# Patient Record
Sex: FEMALE | Race: WHITE | NOT HISPANIC OR LATINO | URBAN - METROPOLITAN AREA
[De-identification: names, ages, dates, MRNs, and addresses within clinical notes are randomized per-mention and may not be internally consistent; named-entity substitution may affect disease eponyms.]

---

## 2018-04-30 ENCOUNTER — OUTPATIENT (OUTPATIENT)
Dept: OUTPATIENT SERVICES | Facility: HOSPITAL | Age: 73
LOS: 1 days | Discharge: HOME | End: 2018-04-30

## 2018-04-30 DIAGNOSIS — Z12.31 ENCOUNTER FOR SCREENING MAMMOGRAM FOR MALIGNANT NEOPLASM OF BREAST: ICD-10-CM

## 2020-05-14 ENCOUNTER — EMERGENCY (EMERGENCY)
Facility: HOSPITAL | Age: 75
LOS: 0 days | Discharge: HOME | End: 2020-05-14
Attending: EMERGENCY MEDICINE | Admitting: EMERGENCY MEDICINE
Payer: MEDICARE

## 2020-05-14 VITALS
OXYGEN SATURATION: 98 % | SYSTOLIC BLOOD PRESSURE: 212 MMHG | TEMPERATURE: 99 F | RESPIRATION RATE: 16 BRPM | DIASTOLIC BLOOD PRESSURE: 93 MMHG | HEART RATE: 100 BPM

## 2020-05-14 VITALS — DIASTOLIC BLOOD PRESSURE: 81 MMHG | SYSTOLIC BLOOD PRESSURE: 170 MMHG | HEART RATE: 86 BPM
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
## 2020-05-14 DIAGNOSIS — S09.90XA UNSPECIFIED INJURY OF HEAD, INITIAL ENCOUNTER: ICD-10-CM
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
## 2020-05-14 DIAGNOSIS — Z23 ENCOUNTER FOR IMMUNIZATION: ICD-10-CM

## 2020-05-14 DIAGNOSIS — S01.01XA LACERATION WITHOUT FOREIGN BODY OF SCALP, INITIAL ENCOUNTER: ICD-10-CM
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
## 2020-05-14 PROCEDURE — 70450 CT HEAD/BRAIN W/O DYE: CPT | Mod: 26

## 2020-05-14 PROCEDURE — 12001 RPR S/N/AX/GEN/TRNK 2.5CM/<: CPT

## 2020-05-14 PROCEDURE — 99284 EMERGENCY DEPT VISIT MOD MDM: CPT | Mod: 25

## 2020-05-14 RX ORDER — TETANUS TOXOID, REDUCED DIPHTHERIA TOXOID AND ACELLULAR PERTUSSIS VACCINE, ADSORBED 5; 2.5; 8; 8; 2.5 [IU]/.5ML; [IU]/.5ML; UG/.5ML; UG/.5ML; UG/.5ML
0.5 SUSPENSION INTRAMUSCULAR ONCE
Refills: 0 | Status: COMPLETED | OUTPATIENT
Start: 2020-05-14 | End: 2020-05-14

## 2020-05-14 RX ORDER — ACETAMINOPHEN 500 MG
650 TABLET ORAL ONCE
Refills: 0 | Status: COMPLETED | OUTPATIENT
Start: 2020-05-14 | End: 2020-05-14

## 2020-05-14 RX ADMIN — TETANUS TOXOID, REDUCED DIPHTHERIA TOXOID AND ACELLULAR PERTUSSIS VACCINE, ADSORBED 0.5 MILLILITER(S): 5; 2.5; 8; 8; 2.5 SUSPENSION INTRAMUSCULAR at 17:00

## 2020-05-14 RX ADMIN — Medication 650 MILLIGRAM(S): at 17:00

## 2020-05-14 NOTE — ED PROVIDER NOTE - CLINICAL SUMMARY MEDICAL DECISION MAKING FREE TEXT BOX
Laceration repaired without incident.  CT negative for intracranial pathology.  Head injury instructions discussed and strict return instructions discussed.

## 2020-05-14 NOTE — ED PROVIDER NOTE - PROGRESS NOTE DETAILS
FRANCOIS: Patient reports improvement in headache after tylenol, lac repaired, ct head negative for acute injury. Results discussed with patient, printed copies given. Patient agreeable and verbalizes understanding of plan of care, f/u, and return precautions.

## 2020-05-14 NOTE — ED PROVIDER NOTE - PHYSICAL EXAMINATION
VITALS:  I have reviewed the initial vital signs.  GENERAL: Well-developed, well-nourished, in no acute distress.  HEENT: 1.5 cm linear horizontal laceration to mid posterior scalp. no active bleeding/drainage. no subcutaneous structures visualized. No santana sign, raccoon eyes, or hemotympanum. Sclera clear. No hyphema. EOMI, PERRLA. No entrapment. No epistaxis or nasal septal hematoma. Dentition stable. No malocclusion. No [place] for[place] fracture. No facial bony ttp.  NECK: supple w FROM. No paraspinal muscle ttp. No midline cervical spinous tenderness, step offs, or deformity.  CARDIO: RRR, nl S1 and S2. No murmurs, rubs, or gallops. 2+ radial and pedal pulses bilaterally. No chest wall tenderness.  PULM: Normal effort. CTA b/l without wheezes, rales, or rhonchi.  MSK: FROM to extremities x4 w/o swelling/erythema/ecchymosis/deformity/ttp. No midline thoracic or lumbar spinous tenderness, step offs, or deformity. Normal steady gait.  GI: Abdomen soft and non-distended. Nontender throughout.  SKIN: Warm, dry.   NEURO: A&Ox3. Speech clear. CN II-XII intact. 5/5 strength to upper and lower extremities b/l. Sensation intact and equal throughout. Normal finger to nose. No pronator drift.

## 2020-05-14 NOTE — ED PROVIDER NOTE - NS ED ROS FT
EYES: (-) vision changes, (-) blurry vision, (-) double vision  ENT: (-) ear pain, (-) epistaxis, (-) tooth pain, (-) facial swelling  NECK: (-) neck pain, (-) neck stiffness  CARDIO: (-) chest pain, (-) palpitations  PULM: (-) cough, (-) shortness of breath  GI: (-) nausea, (-) vomiting, (-) abdominal pain  HEME: (-) easy bruising, (-) easy bleeding  MSK: (-) back pain, (-) gait difficulty, (-) joint pain, (-) deformity, (-) joint swelling  SKIN: see HPI, (-) ecchymosis  NEURO: see HPI, (-) LOC, (-) dizziness, (-) lightheadedness,  (-) weakness, (-) paresthesias, (-) numbness, (-) syncope, (-) speech changes, (-) facial droop, (-) confusion, (-) seizures    *all other systems negative except as documented above and in the HPI*

## 2020-05-14 NOTE — ED PROVIDER NOTE - ATTENDING CONTRIBUTION TO CARE
74 y/o female, non smoker with hx of HTN, DM, takes ASA 81mg QD presents to ED with head laceration s/p mechanical trip and fall at home with impact to occiput vs. wood table.  No LOC.  No numbness or tingling.  No HA/neck pain.  No weakness.  No blurry vison.  PE:  agree with above.  A/P  Head injury, scalp laceration.  Repair and imaging.

## 2020-05-14 NOTE — ED ADULT NURSE NOTE - NSIMPLEMENTINTERV_GEN_ALL_ED
Implemented All Universal Safety Interventions:  [place] to call system. Call bell, personal items and telephone within reach. Instruct patient to call for assistance. Room bathroom lighting operational. Non-slip footwear when patient is off stretcher. Physically safe environment: no spills, clutter or unnecessary equipment. Stretcher in lowest position, wheels locked, appropriate side rails in place.

## 2020-05-14 NOTE — ED PROVIDER NOTE - NSFOLLOWUPINSTRUCTIONS_ED_ALL_ED_FT
Laceration    A laceration is a cut that goes through all of the layers of the skin and into the tissue that is right under the skin. Some lacerations heal on their own. Others need to be closed with skin adhesive strips, skin glue, stitches (sutures), or staples. Proper laceration care minimizes the risk of infection and helps the laceration to heal better.  If non-absorbable stitches or staples have been placed, they must be taken out within the time frame instructed by your healthcare provider.    SEEK IMMEDIATE MEDICAL CARE IF YOU HAVE ANY OF THE FOLLOWING SYMPTOMS: swelling around the wound, worsening pain, drainage from the wound, red streaking going away from your wound, inability to move finger or toe near the laceration, or discoloration of skin near the laceration.      Closed Head Injury    A closed head injury is an injury to your head that may or may not involve a traumatic brain injury (TBI).  A CT scan of the head may not have been performed because they are usually normal after a concussion. Concussions are diagnosed and managed based on the history given and the symptoms experienced after the head injury. Most concussions do not cause serious problem and get better over several days.  Symptoms of TBI can be short or long lasting and include headache, dizziness, interference with memory or speech, fatigue, confusion, changes in sleep, mood changes, nausea, depression/anxiety, and dulling of senses. Make sure to obtain proper rest which includes getting plenty of sleep, avoiding excessive visual stimulation, and avoiding activities that may cause physical or mental stress. Avoid any situation where there is potential for another head injury, including sports.    SEEK IMMEDIATE MEDICAL CARE IF YOU HAVE ANY OF THE FOLLOWING SYMPTOMS: unusual drowsiness, vomiting, severe dizziness, seizures, lightheadedness, muscular weakness, different pupil sizes, visual changes, or clear or bloody discharge from your ears or nose.

## 2020-05-14 NOTE — ED PROVIDER NOTE - PATIENT PORTAL LINK FT
You can access the FollowMyHealth Patient Portal offered by [facility] by registering at the following website: [email or web address]. By joining [employer]’s FollowMyHealth portal, you will also be able to view your health information using other applications (apps) compatible with our system.

## 2020-05-14 NOTE — ED PROVIDER NOTE - OBJECTIVE STATEMENT
75 year old female w hx of HTN, DM, on 81 mg ASA daily, presents to the ED for evaluation of a head laceration sustained 1 hour prior to arrival. Pt states she was working in her home office, tripped on the corner of a floor mat causing her to fall backward and strike her posterior head against a wooden table. Denies LOC. Remembers everything before/during/after. Has been ambulatory since. Endorses mild, non-radiating, achy pain to site of laceration. No other injuries reported. Denies headaches, vision changes, neck pain/stiffness, chest pain, shortness of breath, back pain, abd pain, n/v, dizziness, lightheadedness, behavioral/mental status change, paresthesias, numbness, weakness.

## 2020-05-18 ENCOUNTER — TRANSCRIPTION ENCOUNTER (OUTPATIENT)
Age: 75
End: 2020-05-18

## 2021-03-05 ENCOUNTER — OUTPATIENT (OUTPATIENT)
Dept: OUTPATIENT SERVICES | Facility: HOSPITAL | Age: 76
LOS: 1 days | Discharge: HOME | End: 2021-03-05
Payer: MEDICARE

## 2021-03-05 DIAGNOSIS — Z12.31 ENCOUNTER FOR SCREENING MAMMOGRAM FOR MALIGNANT NEOPLASM OF BREAST: ICD-10-CM

## 2021-03-05 PROCEDURE — 77067 SCR MAMMO BI INCL CAD: CPT | Mod: 26

## 2021-03-05 PROCEDURE — 77063 BREAST TOMOSYNTHESIS BI: CPT | Mod: 26

## 2021-04-08 ENCOUNTER — OUTPATIENT (OUTPATIENT)
Dept: OUTPATIENT SERVICES | Facility: HOSPITAL | Age: 76
LOS: 1 days | Discharge: HOME | End: 2021-04-08
Payer: MEDICARE

## 2021-04-08 DIAGNOSIS — R92.8 OTHER ABNORMAL AND INCONCLUSIVE FINDINGS ON DIAGNOSTIC IMAGING OF BREAST: ICD-10-CM

## 2021-04-08 PROCEDURE — 77065 DX MAMMO INCL CAD UNI: CPT | Mod: 26,LT

## 2021-04-08 PROCEDURE — G0279: CPT | Mod: 26

## 2021-04-08 PROCEDURE — 76641 ULTRASOUND BREAST COMPLETE: CPT | Mod: 26,LT

## 2021-04-19 ENCOUNTER — RESULT REVIEW (OUTPATIENT)
Age: 76
End: 2021-04-19

## 2021-04-19 ENCOUNTER — OUTPATIENT (OUTPATIENT)
Dept: OUTPATIENT SERVICES | Facility: HOSPITAL | Age: 76
LOS: 1 days | Discharge: HOME | End: 2021-04-19
Payer: MEDICARE

## 2021-04-19 PROCEDURE — 88342 IMHCHEM/IMCYTCHM 1ST ANTB: CPT | Mod: 26

## 2021-04-19 PROCEDURE — 88305 TISSUE EXAM BY PATHOLOGIST: CPT | Mod: 26

## 2021-04-19 PROCEDURE — 77065 DX MAMMO INCL CAD UNI: CPT | Mod: 26,LT

## 2021-04-19 PROCEDURE — 19083 BX BREAST 1ST LESION US IMAG: CPT | Mod: LT

## 2021-04-19 PROCEDURE — 88341 IMHCHEM/IMCYTCHM EA ADD ANTB: CPT | Mod: 26

## 2021-04-20 LAB — SURGICAL PATHOLOGY STUDY: SIGNIFICANT CHANGE UP

## 2021-04-21 DIAGNOSIS — N63.20 UNSPECIFIED LUMP IN THE LEFT BREAST, UNSPECIFIED QUADRANT: ICD-10-CM

## 2021-04-21 DIAGNOSIS — R92.1 MAMMOGRAPHIC CALCIFICATION FOUND ON DIAGNOSTIC IMAGING OF BREAST: ICD-10-CM

## 2021-05-20 ENCOUNTER — APPOINTMENT (OUTPATIENT)
Dept: BREAST CENTER | Facility: CLINIC | Age: 76
End: 2021-05-20
Payer: MEDICARE

## 2021-05-20 VITALS
WEIGHT: 180 LBS | HEIGHT: 65 IN | BODY MASS INDEX: 29.99 KG/M2 | SYSTOLIC BLOOD PRESSURE: 140 MMHG | TEMPERATURE: 97.1 F | DIASTOLIC BLOOD PRESSURE: 90 MMHG

## 2021-05-20 DIAGNOSIS — Z86.39 PERSONAL HISTORY OF OTHER ENDOCRINE, NUTRITIONAL AND METABOLIC DISEASE: ICD-10-CM

## 2021-05-20 DIAGNOSIS — Z87.891 PERSONAL HISTORY OF NICOTINE DEPENDENCE: ICD-10-CM

## 2021-05-20 DIAGNOSIS — Z86.79 PERSONAL HISTORY OF OTHER DISEASES OF THE CIRCULATORY SYSTEM: ICD-10-CM

## 2021-05-20 DIAGNOSIS — Z78.9 OTHER SPECIFIED HEALTH STATUS: ICD-10-CM

## 2021-05-20 PROCEDURE — 99203 OFFICE O/P NEW LOW 30 MIN: CPT

## 2021-05-20 RX ORDER — SIMVASTATIN 80 MG/1
TABLET, FILM COATED ORAL
Refills: 0 | Status: ACTIVE | COMMUNITY

## 2021-05-20 RX ORDER — LOSARTAN POTASSIUM 100 MG/1
TABLET, FILM COATED ORAL
Refills: 0 | Status: ACTIVE | COMMUNITY

## 2021-05-20 RX ORDER — METFORMIN HYDROCHLORIDE 625 MG/1
TABLET ORAL
Refills: 0 | Status: ACTIVE | COMMUNITY

## 2021-05-20 RX ORDER — HYDROCHLOROTHIAZIDE 12.5 MG/1
TABLET ORAL
Refills: 0 | Status: ACTIVE | COMMUNITY

## 2021-05-20 NOTE — HISTORY OF PRESENT ILLNESS
[FreeTextEntry1] : PCP: Dr. Ashley Tim\par \par s/p US Guided Core Bx - 04/19/2021:\par Left, 5:00 N5, 1.6cm: (stoplight)\par - Complex sclerosing lesion (radial scar) containing microcalcifications.\par \par Pt denies any palpable lumps, breast pain, nipple discharge or inversion and / or skin changes.\par \par No previous breast intervention.\par \par (-) family hx - breast / ovarian cancer.\par \par Annika Model Risk Assessment:\par 5 yr - 2.5% \par Life - 5.5%\par

## 2021-05-20 NOTE — ASSESSMENT
[FreeTextEntry1] : LONNY is a aaron 76 year old patient who presented today for surgical consultation.\par She is status post ultrasound guided core biopsy on 04/19/2021.\par Pathology revealed:\par Left, 5:00 N5, 1.6cm: (stoplight)\par - Complex sclerosing lesion (radial scar) containing microcalcifications.\par \par On physical exam, there are no discrete masses in either breast or axilla.  There is no nipple discharge or inversion bilaterally.  There are no skin changes bilaterally.  \par \par Her pathology results were reviewed.  We discussed radial scars without atypia.  These lesions are considered fibroproliferative lesions without atypia.  Patients with these lesions were found to have a slightly increased relative risk compared to the reference population.  However, the lesions themselves do not have any malignant potential. There is about a 10-20% upgrade rate to a high-risk lesion (including atypical hyperplasias) and a <3% upgrade to cancer.  However, when atypia is present there is up to a 15-25% upgrade rate to cancer.  Both because of the upgrade rate to another high-risk lesion and because her diagnosis is not clear, we have discussed observation versus surgical excision of this lesion.  I have recommended that she pursue surgical excision at this time. Since this is not readily palpable, it will need to be localized preoperatively with a wire placement on the day of her surgery.  She agrees to lumpectomy with needle localization.  \par \par The benefits and risks of the lumpectomy procedure were explained to the patient, including but not limited to bleeding, infection, seroma and/or hematoma formation, pain, numbness of skin, scarring, and possible re-operation if the surgical excision demonstrates positive margins.  Informed consent was obtained today.  She is aware that she will meet with the pre-surgical department here at the hospital for pre-surgery testing.  She is also aware that she will likely need to meet with her primary care physician, and/or other medical specialists to obtain clearance for surgery, and to contact them appropriately.  \par \par I spent a total of 30 minutes of face to face time with this patient, greater than 50% of which was spent in counseling and/or coordination of care.\par All of her questions were appropriately answered.\par She knows to call with any concerns.

## 2021-05-20 NOTE — DATA REVIEWED
[FreeTextEntry1] : B/L Screening Mammo - 03/05/2021:\par MAMMOGRAM FINDINGS:\par Mammography was performed including the following views: bilateral craniocaudal with tomosynthesis, bilateral mediolateral oblique with tomosynthesis, and right anterior mediolateral oblique.  The examination includes digital synthetic 2D and digital tomosynthesis 3D images. Additional imaging analysis was performed using CAD (computer-aided detection) software.\par \par There are scattered areas of fibroglandular density.\par \par There is an apparent architectural distortion seen in the left breast.\par \par No suspicious mass, grouping of calcifications, or other abnormality is identified in the right breast.\par \par IMPRESSION:\par Apparent architectural distortion in the left breast requires additional evaluation.\par \par RECOMMENDATION:\par Patient will be recalled for additional mammographic views and, if indicated, breast ultrasound.\par \par ASSESSMENT:\par BI-RADS Category 0:  Incomplete: Needs Additional Imaging Evaluation\par \par \par Left Uni Dx Mammo & Sono - 04/08/2021:\par Breast composition:There are scattered areas of fibroglandular density.\par \par Mammographic findings: There is persistent architectural distortion in the inferior left breast, approximate 5-6 o'clock axis, posterior third. There is associated focal asymmetry. The area measures approximately 2 cm.\par \par Complete left breast ultrasound: Given the mammographic findings the left breast was examined in its entirety. The axilla is unremarkable. In the 5:00 axis, 5 cm from the nipple there is a 1.6 x 1.0 x 0.8 cm heterogeneous hypoechoic mass with shadowing. This corresponds in size, shape and location to the mammographic finding. Elsewhere in the left breast no solid or cystic lesion is seen.\par \par Impression:1.6 cm suspicious mass in the inferior left breast as above..\par \par Recommendation: Ultrasound guided biopsy.\par \par BI-RADS Category 4: Suspicious\par \par \par US Guided Core Bx - 04/19/2021:\par Left, 5:00 N5, 1.6cm: (stoplight)\par - Complex sclerosing lesion (radial scar) containing microcalcifications.\par

## 2021-06-07 ENCOUNTER — OUTPATIENT (OUTPATIENT)
Dept: OUTPATIENT SERVICES | Facility: HOSPITAL | Age: 76
LOS: 1 days | Discharge: HOME | End: 2021-06-07
Payer: MEDICARE

## 2021-06-07 ENCOUNTER — RESULT REVIEW (OUTPATIENT)
Age: 76
End: 2021-06-07

## 2021-06-07 VITALS
WEIGHT: 180.34 LBS | OXYGEN SATURATION: 99 % | TEMPERATURE: 98 F | HEIGHT: 65 IN | SYSTOLIC BLOOD PRESSURE: 143 MMHG | RESPIRATION RATE: 15 BRPM | DIASTOLIC BLOOD PRESSURE: 77 MMHG | HEART RATE: 78 BPM

## 2021-06-07 DIAGNOSIS — Z01.818 ENCOUNTER FOR OTHER PREPROCEDURAL EXAMINATION: ICD-10-CM

## 2021-06-07 DIAGNOSIS — N64.89 OTHER SPECIFIED DISORDERS OF BREAST: ICD-10-CM

## 2021-06-07 LAB
A1C WITH ESTIMATED AVERAGE GLUCOSE RESULT: 7.4 % — HIGH (ref 4–5.6)
ALBUMIN SERPL ELPH-MCNC: 4.3 G/DL — SIGNIFICANT CHANGE UP (ref 3.5–5.2)
ALP SERPL-CCNC: 64 U/L — SIGNIFICANT CHANGE UP (ref 30–115)
ALT FLD-CCNC: 9 U/L — SIGNIFICANT CHANGE UP (ref 0–41)
ANION GAP SERPL CALC-SCNC: 13 MMOL/L — SIGNIFICANT CHANGE UP (ref 7–14)
AST SERPL-CCNC: 13 U/L — SIGNIFICANT CHANGE UP (ref 0–41)
BASOPHILS # BLD AUTO: 0.04 K/UL — SIGNIFICANT CHANGE UP (ref 0–0.2)
BASOPHILS NFR BLD AUTO: 0.8 % — SIGNIFICANT CHANGE UP (ref 0–1)
BILIRUB SERPL-MCNC: 0.4 MG/DL — SIGNIFICANT CHANGE UP (ref 0.2–1.2)
BUN SERPL-MCNC: 16 MG/DL — SIGNIFICANT CHANGE UP (ref 10–20)
CALCIUM SERPL-MCNC: 9.4 MG/DL — SIGNIFICANT CHANGE UP (ref 8.5–10.1)
CHLORIDE SERPL-SCNC: 101 MMOL/L — SIGNIFICANT CHANGE UP (ref 98–110)
CO2 SERPL-SCNC: 25 MMOL/L — SIGNIFICANT CHANGE UP (ref 17–32)
CREAT SERPL-MCNC: 0.7 MG/DL — SIGNIFICANT CHANGE UP (ref 0.7–1.5)
EOSINOPHIL # BLD AUTO: 0.22 K/UL — SIGNIFICANT CHANGE UP (ref 0–0.7)
EOSINOPHIL NFR BLD AUTO: 4.6 % — SIGNIFICANT CHANGE UP (ref 0–8)
ESTIMATED AVERAGE GLUCOSE: 166 MG/DL — HIGH (ref 68–114)
GLUCOSE SERPL-MCNC: 249 MG/DL — HIGH (ref 70–99)
HCT VFR BLD CALC: 40.9 % — SIGNIFICANT CHANGE UP (ref 37–47)
HGB BLD-MCNC: 13.5 G/DL — SIGNIFICANT CHANGE UP (ref 12–16)
IMM GRANULOCYTES NFR BLD AUTO: 0.4 % — HIGH (ref 0.1–0.3)
LYMPHOCYTES # BLD AUTO: 0.94 K/UL — LOW (ref 1.2–3.4)
LYMPHOCYTES # BLD AUTO: 19.5 % — LOW (ref 20.5–51.1)
MCHC RBC-ENTMCNC: 28.5 PG — SIGNIFICANT CHANGE UP (ref 27–31)
MCHC RBC-ENTMCNC: 33 G/DL — SIGNIFICANT CHANGE UP (ref 32–37)
MCV RBC AUTO: 86.5 FL — SIGNIFICANT CHANGE UP (ref 81–99)
MONOCYTES # BLD AUTO: 0.29 K/UL — SIGNIFICANT CHANGE UP (ref 0.1–0.6)
MONOCYTES NFR BLD AUTO: 6 % — SIGNIFICANT CHANGE UP (ref 1.7–9.3)
NEUTROPHILS # BLD AUTO: 3.32 K/UL — SIGNIFICANT CHANGE UP (ref 1.4–6.5)
NEUTROPHILS NFR BLD AUTO: 68.7 % — SIGNIFICANT CHANGE UP (ref 42.2–75.2)
NRBC # BLD: 0 /100 WBCS — SIGNIFICANT CHANGE UP (ref 0–0)
PLATELET # BLD AUTO: 266 K/UL — SIGNIFICANT CHANGE UP (ref 130–400)
POTASSIUM SERPL-MCNC: 3.8 MMOL/L — SIGNIFICANT CHANGE UP (ref 3.5–5)
POTASSIUM SERPL-SCNC: 3.8 MMOL/L — SIGNIFICANT CHANGE UP (ref 3.5–5)
PROT SERPL-MCNC: 6.5 G/DL — SIGNIFICANT CHANGE UP (ref 6–8)
RBC # BLD: 4.73 M/UL — SIGNIFICANT CHANGE UP (ref 4.2–5.4)
RBC # FLD: 13.2 % — SIGNIFICANT CHANGE UP (ref 11.5–14.5)
SODIUM SERPL-SCNC: 139 MMOL/L — SIGNIFICANT CHANGE UP (ref 135–146)
WBC # BLD: 4.83 K/UL — SIGNIFICANT CHANGE UP (ref 4.8–10.8)
WBC # FLD AUTO: 4.83 K/UL — SIGNIFICANT CHANGE UP (ref 4.8–10.8)

## 2021-06-07 PROCEDURE — 71046 X-RAY EXAM CHEST 2 VIEWS: CPT | Mod: 26

## 2021-06-07 PROCEDURE — 93010 ELECTROCARDIOGRAM REPORT: CPT

## 2021-06-07 NOTE — H&P PST ADULT - HISTORY OF PRESENT ILLNESS
PT SCHEDULED FOR LEFT BREAST LUMPECTOMY WITH NEEDLE LOCALIZATION.  PT REPORTS- I HAVE A LUMP IN MY LEFT BREAST--I WENT FOR A ROUTINE MAMMO - WHEN THEY FOUND A LUMP IN MY LEFT BREAST.

## 2021-06-07 NOTE — H&P PST ADULT - REASON FOR ADMISSION
PT PRESENTS TO PAST WITH NO SOB, CP, PALPITATIONS, DYSURIA, UTI OR URI AT PRESENT.   PT ABLE TO WALK UP 2-3 FLIGHTS OF STEPS WITH NO SOB.  AS PER THE PT, THIS IS HIS/HER COMPLETE MEDICAL AND SURGICAL HX, INCLUDING MEDICATIONS PRESCRIBED AND OVER THE COUNTER  pt denies any covid s/s, or tested positive in the past  pt advised self quarantine till day of procedure  denies travel outside the [place] in the past 30 days  Anesthesia Alert  NO--Difficult Airway  NO--History of neck surgery or radiation  NO--Limited ROM of neck  NO--History of Malignant hyperthermia  NO--Personal or family history of Pseudocholinesterase deficiency  NO--Prior Anesthesia Complication  NO--Latex Allergy  NO--Loose teeth  NO--History of Rheumatoid Arthritis  NO--SULEMA  NO BLEEDING RISK  NO--OtheR

## 2021-06-18 ENCOUNTER — LABORATORY RESULT (OUTPATIENT)
Age: 76
End: 2021-06-18

## 2021-06-18 ENCOUNTER — OUTPATIENT (OUTPATIENT)
Dept: OUTPATIENT SERVICES | Facility: HOSPITAL | Age: 76
LOS: 1 days | Discharge: HOME | End: 2021-06-18

## 2021-06-18 DIAGNOSIS — Z11.59 ENCOUNTER FOR SCREENING FOR OTHER VIRAL DISEASES: ICD-10-CM

## 2021-06-18 PROBLEM — I10 ESSENTIAL (PRIMARY) HYPERTENSION: Chronic | Status: ACTIVE | Noted: 2021-06-07

## 2021-06-18 PROBLEM — E11.9 TYPE 2 DIABETES MELLITUS WITHOUT COMPLICATIONS: Chronic | Status: ACTIVE | Noted: 2021-06-07

## 2021-06-18 PROBLEM — E78.00 PURE HYPERCHOLESTEROLEMIA, UNSPECIFIED: Chronic | Status: ACTIVE | Noted: 2021-06-07

## 2021-06-21 ENCOUNTER — RESULT REVIEW (OUTPATIENT)
Age: 76
End: 2021-06-21

## 2021-06-21 ENCOUNTER — APPOINTMENT (OUTPATIENT)
Dept: BREAST CENTER | Facility: AMBULATORY SURGERY CENTER | Age: 76
End: 2021-06-21
Payer: MEDICARE

## 2021-06-21 ENCOUNTER — OUTPATIENT (OUTPATIENT)
Dept: OUTPATIENT SERVICES | Facility: HOSPITAL | Age: 76
LOS: 1 days | Discharge: HOME | End: 2021-06-21
Payer: MEDICARE

## 2021-06-21 VITALS
TEMPERATURE: 99 F | SYSTOLIC BLOOD PRESSURE: 129 MMHG | HEART RATE: 82 BPM | OXYGEN SATURATION: 97 % | RESPIRATION RATE: 18 BRPM | HEIGHT: 65 IN | WEIGHT: 180.34 LBS | DIASTOLIC BLOOD PRESSURE: 68 MMHG

## 2021-06-21 VITALS
RESPIRATION RATE: 20 BRPM | HEART RATE: 74 BPM | DIASTOLIC BLOOD PRESSURE: 73 MMHG | OXYGEN SATURATION: 98 % | SYSTOLIC BLOOD PRESSURE: 153 MMHG

## 2021-06-21 LAB — GLUCOSE BLDC GLUCOMTR-MCNC: 151 MG/DL — HIGH (ref 70–99)

## 2021-06-21 PROCEDURE — 19125 EXCISION BREAST LESION: CPT | Mod: LT

## 2021-06-21 PROCEDURE — 19285 PERQ DEV BREAST 1ST US IMAG: CPT | Mod: LT

## 2021-06-21 PROCEDURE — 88305 TISSUE EXAM BY PATHOLOGIST: CPT | Mod: 26

## 2021-06-21 PROCEDURE — 77065 DX MAMMO INCL CAD UNI: CPT | Mod: 26,LT

## 2021-06-21 PROCEDURE — 88341 IMHCHEM/IMCYTCHM EA ADD ANTB: CPT | Mod: 26

## 2021-06-21 PROCEDURE — 88342 IMHCHEM/IMCYTCHM 1ST ANTB: CPT | Mod: 26

## 2021-06-21 RX ORDER — SODIUM CHLORIDE 9 MG/ML
1000 INJECTION, SOLUTION INTRAVENOUS
Refills: 0 | Status: DISCONTINUED | OUTPATIENT
Start: 2021-06-21 | End: 2021-07-05

## 2021-06-21 RX ORDER — LOSARTAN/HYDROCHLOROTHIAZIDE 100MG-25MG
1 TABLET ORAL
Qty: 0 | Refills: 0 | DISCHARGE

## 2021-06-21 RX ORDER — ONDANSETRON 8 MG/1
4 TABLET, FILM COATED ORAL ONCE
Refills: 0 | Status: DISCONTINUED | OUTPATIENT
Start: 2021-06-21 | End: 2021-07-05

## 2021-06-21 RX ORDER — OXYCODONE AND ACETAMINOPHEN 5; 325 MG/1; MG/1
1 TABLET ORAL EVERY 4 HOURS
Refills: 0 | Status: DISCONTINUED | OUTPATIENT
Start: 2021-06-21 | End: 2021-06-21

## 2021-06-21 RX ADMIN — SODIUM CHLORIDE 100 MILLILITER(S): 9 INJECTION, SOLUTION INTRAVENOUS at 13:40

## 2021-06-21 NOTE — CHART NOTE - NSCHARTNOTEFT_GEN_A_CORE
PACU ANESTHESIA ADMISSION NOTE      Procedure: Lumpectomy of left breast with needle localization      Post op diagnosis:  Radial scar of left breast        ____  Intubated  TV:______       Rate: ______      FiO2: ______    _x___  Patent Airway    _x___  Full return of protective reflexes    _x___  Full recovery from anesthesia / back to baseline status    Vitals:  see anesthesia record  Mental Status:  _x___ Awake   _____ Alert   _____ Drowsy   _____ Sedated    Nausea/Vomiting:  _x___  NO       ______Yes,   See Post - Op Orders         Pain Scale (0-10):  __0___    Treatment: _x___ None    ____ See Post - Op/PCA Orders    Post - Operative Fluids:   __x__ Oral   ____ See Post - Op Orders    Plan: Discharge:   _x___Home       _____Floor     _____Critical Care    _____  Other:_________________    Comments:  No anesthesia issues or complications noted.  Discharge when criteria met.

## 2021-06-21 NOTE — ASU DISCHARGE PLAN (ADULT/PEDIATRIC) - CARE PROVIDER_API CALL
Juli Hsu)  Surgery  [address] [facility], [address] [place] [address]  Phone: [phone]  Fax: [phone]  Follow Up Time:

## 2021-06-21 NOTE — ASU DISCHARGE PLAN (ADULT/PEDIATRIC) - ASU DC SPECIAL INSTRUCTIONSFT
Resume regular diet  No heavy lifting more than 15 lbs for two weeks  Please remove plastic dressing in three days. Leave white tape in place. May shower tomorrow. Wear a supportive bra.   Take Tylenol for pain as needed.

## 2021-06-28 DIAGNOSIS — I10 ESSENTIAL (PRIMARY) HYPERTENSION: ICD-10-CM

## 2021-06-28 DIAGNOSIS — N63.23 UNSPECIFIED LUMP IN THE LEFT BREAST, LOWER OUTER QUADRANT: ICD-10-CM

## 2021-06-28 DIAGNOSIS — D24.2 BENIGN NEOPLASM OF LEFT BREAST: ICD-10-CM

## 2021-06-28 DIAGNOSIS — Z79.82 LONG TERM (CURRENT) USE OF ASPIRIN: ICD-10-CM

## 2021-06-28 DIAGNOSIS — E78.00 PURE HYPERCHOLESTEROLEMIA, UNSPECIFIED: ICD-10-CM

## 2021-06-28 DIAGNOSIS — L90.5 SCAR CONDITIONS AND FIBROSIS OF SKIN: ICD-10-CM

## 2021-06-28 DIAGNOSIS — Z79.84 LONG TERM (CURRENT) USE OF ORAL HYPOGLYCEMIC DRUGS: ICD-10-CM

## 2021-06-28 DIAGNOSIS — N60.32 FIBROSCLEROSIS OF LEFT BREAST: ICD-10-CM

## 2021-06-28 DIAGNOSIS — N60.22 FIBROADENOSIS OF LEFT BREAST: ICD-10-CM

## 2021-06-28 DIAGNOSIS — Z87.891 PERSONAL HISTORY OF NICOTINE DEPENDENCE: ICD-10-CM

## 2021-06-28 DIAGNOSIS — E11.9 TYPE 2 DIABETES MELLITUS WITHOUT COMPLICATIONS: ICD-10-CM

## 2021-06-28 LAB — SURGICAL PATHOLOGY STUDY: SIGNIFICANT CHANGE UP

## 2021-07-08 ENCOUNTER — APPOINTMENT (OUTPATIENT)
Dept: BREAST CENTER | Facility: CLINIC | Age: 76
End: 2021-07-08
Payer: MEDICARE

## 2021-07-08 VITALS
HEIGHT: 65 IN | BODY MASS INDEX: 29.99 KG/M2 | DIASTOLIC BLOOD PRESSURE: 90 MMHG | TEMPERATURE: 98 F | WEIGHT: 180 LBS | SYSTOLIC BLOOD PRESSURE: 152 MMHG

## 2021-07-08 PROCEDURE — 99024 POSTOP FOLLOW-UP VISIT: CPT

## 2021-07-08 NOTE — HISTORY OF PRESENT ILLNESS
[FreeTextEntry1] : PCP: Dr. Ashley Tim\par \par s/p US Guided Core Bx - 04/19/2021:\par Left, 5:00 N5, 1.6cm: (stoplight)\par - Complex sclerosing lesion (radial scar) containing microcalcifications.\par \par Pt denies any signs of infection; no erythema, no discharge, no swelling. Mild tenderness noted around incision area. \par \par No previous breast intervention.\par \par (-) family hx - breast / ovarian cancer.\par \par Annika Model Risk Assessment:\par 5 yr - 2.5% \par Life - 5.5%\par

## 2021-07-08 NOTE — REASON FOR VISIT
[Post Op: _________] : a [unfilled] post op visit [FreeTextEntry1] : She is s/p left breast lumpectomy with needle localization on 06/21/21

## 2021-07-08 NOTE — ASSESSMENT
[FreeTextEntry1] : Patient is status post  left breast lumpectomy with needle localization on 06/21/21\par She is feeling well\par She denies any fever/chills or erythema and / or drainage related to incision area. \par Her pain is well controlled, only complains of mild tenderness of the area.\par Her sutures were removed and pathology was discussed. \par \par She will need a right dx mammo in December 2021 and then f/up after testing.  I informed the patient that I am leaving and she will likely f/up with Dr. Hurst. \par \par \par I spent a total of 15 minutes of face to face time with this patient, greater than 50% of which was spent in counseling and/or coordination of care. All of her questions were appropriately answered. \par \par

## 2021-07-08 NOTE — DATA REVIEWED
[FreeTextEntry1] : Final Diagnosis\par Breast, left lower outer quadrant mass, needle localized\par lumpectomy:\par - Focal atypical lobular hyperplasia (ALH, see comment).\par - Complex sclerosing lesion (radial scar) located adjacent to a\par healing prior biopsy site and a separate intraductal papilloma\par containing focal florid duct hyperplasia.\par - Atrophic fatty breast tissue with proliferative type\par fibrocystic changes including florid duct hyperplasia, stromal\par fibrosis, sclerosing and apocrine adenosis, and\par microcalcifications.\par

## 2021-10-14 ENCOUNTER — OUTPATIENT (OUTPATIENT)
Dept: OUTPATIENT SERVICES | Facility: HOSPITAL | Age: 76
LOS: 1 days | Discharge: HOME | End: 2021-10-14
Payer: MEDICARE

## 2021-10-14 DIAGNOSIS — M25.569 PAIN IN UNSPECIFIED KNEE: ICD-10-CM

## 2021-10-14 PROCEDURE — 73562 X-RAY EXAM OF KNEE 3: CPT | Mod: 26,50

## 2021-12-09 ENCOUNTER — RESULT REVIEW (OUTPATIENT)
Age: 76
End: 2021-12-09

## 2021-12-09 ENCOUNTER — OUTPATIENT (OUTPATIENT)
Dept: OUTPATIENT SERVICES | Facility: HOSPITAL | Age: 76
LOS: 1 days | Discharge: HOME | End: 2021-12-09
Payer: MEDICARE

## 2021-12-09 DIAGNOSIS — R92.8 OTHER ABNORMAL AND INCONCLUSIVE FINDINGS ON DIAGNOSTIC IMAGING OF BREAST: ICD-10-CM

## 2021-12-09 PROCEDURE — 77065 DX MAMMO INCL CAD UNI: CPT | Mod: 26,LT

## 2021-12-09 PROCEDURE — G0279: CPT | Mod: 26

## 2022-01-14 ENCOUNTER — APPOINTMENT (OUTPATIENT)
Dept: BREAST CENTER | Facility: CLINIC | Age: 77
End: 2022-01-14
Payer: MEDICARE

## 2022-01-14 PROCEDURE — 99212 OFFICE O/P EST SF 10 MIN: CPT

## 2022-01-14 NOTE — DATA REVIEWED
[FreeTextEntry1] : Left Uni Dx Mammo - 12/09/2021:\par BREAST COMPOSITION: There are scattered areas of fibroglandular density.\par \par FINDINGS:\par \par Postsurgical architectural distortion is at the 6:00 axis of the left breast, posterior depth. Benign-appearing calcifications are present in the left breast.\par \par No suspicious masses, areas of nonsurgical architectural distortion, or calcifications are seen in the left breast.\par \par \par IMPRESSION:\par \par Post biopsy changes in the left breast.   No mammographic evidence of malignancy. Patient should return in 6 months for bilateral screening mammography.\par \par BI-RADS Category 2: Benign

## 2022-01-14 NOTE — ASSESSMENT
[FreeTextEntry1] : LONNY is a aaron 76 year old patient who presented today in follow up for a history of left breast radial scar, focal ALH and intraductal papilloma.  \par She has been doing well with no new breast related complaints. \par Imaging with a left breast unilateral diagnostic mammogram was done on 12/09/2021, which revealed there are scattered areas of fibroglandular density. Postsurgical architectural distortion is at the 6:00 axis of the left breast, posterior depth. Benign-appearing calcifications are present in the left breast.\par BI-RADS Category 2: Benign, as detailed above. \par Physical exam was unrevealing today.\par \par Imaging with a bilateral screening mammogram will be due in June 2022, and that will be scheduled today. \par She will return for follow-up and clinical breast exam in June 2022 as well.\par \par I spent a total of 15 minutes of face to face time with this patient, greater than 50% of which was spent in counseling and/or coordination of care.\par All of her questions were appropriately answered.\par She knows to call with any concerns.\par \par \par \par \par \par

## 2022-01-14 NOTE — HISTORY OF PRESENT ILLNESS
[FreeTextEntry1] : PCP: Dr. Ashley Tim\par \par s/p US Guided Core Bx - 04/19/2021:\par Left, 5:00 N5, 1.6cm: (stoplight)\par - Complex sclerosing lesion (radial scar) containing microcalcifications.\par \par (-) family hx - breast / ovarian cancer.\par \par Annika Model Risk Assessment: (recalculated w/ surgical path)\par 5 yr - 4.9% \par Life - 10.4%\par \par s/p Left Breast NLOC - 06/21/2021 = Focal atypical lobular hyperplasia. Complex sclerosing lesion (radial scar) located adjacent to a healing prior biopsy site and a separate intraductal papilloma containing focal florid duct hyperplasia.\par \par \par LONNY MELENDREZ is a 76 year old female patient who presents today in follow up for left breast radial scar, focal ALH and intraductal papilloma.\par Since her last visit, she has no new breast related complaints. \par Imaging with a left breast unilateral diagnostic mammogram was done on 12/09/2021, which revealed there are scattered areas of fibroglandular density. Postsurgical architectural distortion is at the 6:00 axis of the left breast, posterior depth. Benign-appearing calcifications are present in the left breast.\par BI-RADS Category 2: Benign .\par \par She presents today for evaluation and imaging review.\par \par

## 2022-01-14 NOTE — REASON FOR VISIT
[Follow-Up: _____] : a [unfilled] follow-up visit [FreeTextEntry1] : h/o left breast radial scar; imaging review.

## 2022-01-14 NOTE — PHYSICAL EXAM
[Normocephalic] : normocephalic [Atraumatic] : atraumatic [No Supraclavicular Adenopathy] : no supraclavicular adenopathy [No dominant masses] : no dominant masses in right breast  [No dominant masses] : no dominant masses left breast [No Nipple Discharge] : no left nipple discharge [de-identified] : B/L scattered fibroglandular densities.  [de-identified] : well healed surgical scar.

## 2022-06-17 ENCOUNTER — OUTPATIENT (OUTPATIENT)
Dept: OUTPATIENT SERVICES | Facility: HOSPITAL | Age: 77
LOS: 1 days | Discharge: HOME | End: 2022-06-17
Payer: MEDICARE

## 2022-06-17 ENCOUNTER — RESULT REVIEW (OUTPATIENT)
Age: 77
End: 2022-06-17

## 2022-06-17 DIAGNOSIS — Z12.31 ENCOUNTER FOR SCREENING MAMMOGRAM FOR MALIGNANT NEOPLASM OF BREAST: ICD-10-CM

## 2022-06-17 PROCEDURE — 77063 BREAST TOMOSYNTHESIS BI: CPT | Mod: 26

## 2022-06-17 PROCEDURE — 77067 SCR MAMMO BI INCL CAD: CPT | Mod: 26

## 2022-08-01 ENCOUNTER — APPOINTMENT (OUTPATIENT)
Dept: OPHTHALMOLOGY | Facility: CLINIC | Age: 77
End: 2022-08-01

## 2022-08-01 ENCOUNTER — OUTPATIENT (OUTPATIENT)
Dept: OUTPATIENT SERVICES | Facility: HOSPITAL | Age: 77
LOS: 1 days | Discharge: HOME | End: 2022-08-01

## 2022-08-01 PROCEDURE — 92136 OPHTHALMIC BIOMETRY: CPT | Mod: 26

## 2022-08-29 ENCOUNTER — TRANSCRIPTION ENCOUNTER (OUTPATIENT)
Age: 77
End: 2022-08-29

## 2022-08-29 ENCOUNTER — OUTPATIENT (OUTPATIENT)
Dept: OUTPATIENT SERVICES | Facility: HOSPITAL | Age: 77
LOS: 1 days | Discharge: HOME | End: 2022-08-29

## 2022-08-29 VITALS
TEMPERATURE: 97 F | RESPIRATION RATE: 18 BRPM | SYSTOLIC BLOOD PRESSURE: 136 MMHG | OXYGEN SATURATION: 100 % | DIASTOLIC BLOOD PRESSURE: 80 MMHG | HEART RATE: 95 BPM | HEIGHT: 65 IN | WEIGHT: 175.05 LBS

## 2022-08-29 VITALS — DIASTOLIC BLOOD PRESSURE: 72 MMHG | RESPIRATION RATE: 16 BRPM | SYSTOLIC BLOOD PRESSURE: 112 MMHG | HEART RATE: 87 BPM

## 2022-08-29 DIAGNOSIS — Z98.890 OTHER SPECIFIED POSTPROCEDURAL STATES: Chronic | ICD-10-CM

## 2022-08-29 LAB — GLUCOSE BLDC GLUCOMTR-MCNC: 207 MG/DL — HIGH (ref 70–99)

## 2022-08-29 RX ORDER — METFORMIN HYDROCHLORIDE 850 MG/1
1 TABLET ORAL
Qty: 0 | Refills: 0 | DISCHARGE

## 2022-08-29 RX ORDER — LOSARTAN POTASSIUM 100 MG/1
1 TABLET, FILM COATED ORAL
Qty: 0 | Refills: 0 | DISCHARGE

## 2022-08-29 RX ORDER — SIMVASTATIN 20 MG/1
1 TABLET, FILM COATED ORAL
Qty: 0 | Refills: 0 | DISCHARGE

## 2022-08-29 NOTE — ASU PREOP CHECKLIST - AS BP NONINV SITE
DATE OF SERVICE:  11/13/2017    PREOPERATIVE DIAGNOSES:  1.  Right hallux valgus deformity.  2.  Right interphalangeus.    POSTOPERATIVE DIAGNOSES:  1.  Right hallux valgus deformity.  2.  Right interphalangeus.    PROCEDURE PERFORMED:  1.  Right proximal hallux valgus correction with autologous bone grafting.  2.  Right Fantasma osteotomy.    SURGEON:  Moy Yost MD    FIRST ASSISTANT:  Eran Husain MD    SECOND ASSISTANT:  Amanda Scanlon.    ANESTHESIA:  General endotracheal with popliteal block per my request for   postoperative pain management.    ESTIMATED BLOOD LOSS:  None.    COMPLICATIONS:  None.    POSTOPERATIVE PLAN:  1.  Heel-touch weightbearing.  2.  Preoperative antibiotics.  3.  Follow up in one week.    INDICATIONS:  The patient is a pleasant 15-year-old female who has had   problems with her right foot.  The above diagnoses made and options were   discussed with her including operative and nonoperative.  She elected to   undergo operative intervention.  The above procedure was discussed and all   questions were answered.  Risks of surgery were explained, which include but   not limited to wound problems, infection, nerve injury, vascular injury, and   need for further surgery.  She understands she could have persistent risk for   pain, malunion, nonunion, joint stiffness, overcorrection, under correction,   and need for further surgery.  She understands and accepts these risks and   wished to proceed.  Site was marked by myself prior to receiving psychotropic   medicines.    PROCEDURE IN DETAIL:  The patient was given a popliteal block per my request   for postoperative pain management.  She was brought into the operating room   and underwent general endotracheal anesthesia without complications.  Right   lower extremity was prepped and draped in standard fashion in supine position   with all appropriate padding.  Positive site verification confirmed her right   lower extremity, as well  as procedure and confirmation that she received   preoperative antibiotics.  Esmarch was used to exsanguinate her foot and ankle   and leg tourniquet was inflated to ____ mmHg.    The patient was brought into the operating room after undergoing a popliteal   block per my request for postoperative pain management.  Her right lower   extremity was prepped and draped in standard fashion in supine position with   all appropriate padding.  Positive site verification confirmed her right lower   extremity, as well as the above procedure and confirmation that she received   preoperative antibiotics.  Esmarch was used to exsanguinate foot and ankle,   and leg tourniquet was inflated to 250 mmHg. Medial incision was made centered   over the medial eminence.  It extended back medial to the proximal TMT joint.    This is down to the capsule.  The capsule was opened up and was elevated off   of the medial eminence.  The medial eminence was excised with a 38 blade.    Proximally osteotomy was made in the proximal aspect of the metatarsal   medially going to, but not through the lateral cortex.  This allowed for   opening of the osteotomy.  This was done with a distractor.  Once it was   completed, the C-arm imaging confirmed neutralization of the intermetatarsal   angle.  An Arthrex proximal opening wedge plate was placed.  It was transfixed   with two screws proximal and distal.  A stab incision was made over the   lateral aspect of the calcaneus and the 4.5 drill sleeve was used to take   multiple cores of bone from the calcaneus.  It was then morselized and placed   into the osteotomy site.  Evaluation still demonstrated some significant   interphalangeus.  The incision was carried distal.  Blunt dissection was made   supraperiosteally over the proximal aspect of the proximal phalanx and a 0.038   sagittal saw blade was used to make a closing wedge osteotomy.  This was   transfixed with a [place] 3.0 cannulated screw from the  medial base of phalanx   crossing the osteotomy.  It had excellent compression.  The wound was then   irrigated with copious irrigation.  The medial capsule was repaired with   multiple 0 Vicryls.  Final C-arm imaging demonstrates satisfactory position   for internal fixation with neutralization of the intermetatarsal angle hallux   valgus angle and interphalangeus.  So at that time specific correction of the   DMA with the distal osteotomy was probably not indicated.  Wounds were   irrigated with copious irrigation and closed in layered fashion using 3-0   Vicryl and 3-0 nylon.  Sterile dressings were applied.  Tourniquet was   released.  All toes were pink.  She was transferred to recovery room in good   condition.    Utilization of Dr. Wyane was necessary for patient positioning, holding,   retracting, wound closure, dressing placement.  He was present throughout the   entire procedure.       ____________________________________     MD AMARA NAVARRO / CALLUM    DD:  11/13/2017 16:22:15  DT:  11/13/2017 16:52:36    D#:  [id number]  Job#:  [id number]   left upper arm

## 2022-08-29 NOTE — ASU PATIENT PROFILE, ADULT - FALL HARM RISK - HARM RISK INTERVENTIONS

## 2022-08-31 DIAGNOSIS — H26.9 UNSPECIFIED CATARACT: ICD-10-CM

## 2022-09-12 ENCOUNTER — APPOINTMENT (OUTPATIENT)
Dept: BREAST CENTER | Facility: CLINIC | Age: 77
End: 2022-09-12

## 2022-09-12 PROCEDURE — 99212 OFFICE O/P EST SF 10 MIN: CPT

## 2022-09-12 NOTE — ASSESSMENT
[FreeTextEntry1] : LONNY is a aaron 77 year old patient who presented today in follow up for a history of left breast radial scar, focal ALH and intraductal papilloma.  \par She has been doing well with no new breast related complaints. \par \par \par Her imaging is as follows;\par 06/17/2022 b/l mammo \par - scattered areas of fibroglandular density.\par -an area of benign architectural distortion corresponding to the site of surgery seen in the left breast.\par BIRADS2\par \par Physical exam was unrevealing today.\par \par PLAN:\par - bilateral screening mammogram will be due in June 2023, and that will be scheduled today. \par -She will return for follow-up and clinical breast exam in June 2023 as well.\par \par \par All of her questions were appropriately answered.\par She knows to call with any concerns.\par \par \par \par \par \par

## 2022-09-12 NOTE — DATA REVIEWED
[FreeTextEntry1] : ACC: [id number]     EXAM:  MG MAMMO SCREEN W SAVITA BI#\par \par PROCEDURE DATE:  06/17/2022\par \par \par \par INTERPRETATION:  HISTORY:\par Bilateral MG MAMMO SCREEN W SAVITA BI# was performed. Patient is 77 years old and is seen for screening. The patient has no personal history of cancer.  The patient has the following family history of breast cancer:  female cousin, breast cancer.\par \par RISK ASSESSMENT:\par NCI Lifetime Risk: 5.1\par Tyrer-Brittneyzick Lifetime Risk: 3.6\par \par CLINICAL BREAST EXAM:\par The patient reports their last clinical breast exam was performed within the past year.\par \par COMPARISON STUDIES:\par The present examination has been compared to prior imaging studies performed at [facility] on 04/30/2018, 03/05/2021, 04/08/2021, 04/19/2021, 06/21/2021 and 12/09/2021.\par \par MAMMOGRAM FINDINGS:\par Mammography was performed including the following views: bilateral craniocaudal with tomosynthesis, bilateral mediolateral oblique with tomosynthesis.  The examination includes digital synthetic 2D and digital tomosynthesis 3D images. Additional imaging analysis was performed using CAD (computer-aided detection) software.\par \par There are scattered areas of fibroglandular density.\par \par There is an area of benign architectural distortion corresponding to the site of surgery seen in the left breast.\par \par No suspicious mass, grouping of calcifications, or other abnormality is identified.\par \par IMPRESSION:\par There is no mammographic evidence of malignancy.\par \par RECOMMENDATION:\par Unless otherwise indicated by clinical findings, annual screening mammography recommended.\par \par ASSESSMENT:\par BI-RADS Category 2:  Benign\par \par

## 2022-09-12 NOTE — HISTORY OF PRESENT ILLNESS
[FreeTextEntry1] : PCP: Dr. Ashley Tim\par \par s/p US Guided Core Bx - 04/19/2021:\par Left, 5:00 N5, 1.6cm: (stoplight)\par - Complex sclerosing lesion (radial scar) containing microcalcifications.\par \par (-) family hx - breast / ovarian cancer.\par \par Annika Model Risk Assessment: (recalculated w/ surgical path)\par 5 yr - 4.9% \par Life - 10.4%\par \par s/p Left Breast NLOC - 06/21/2021 = Focal atypical lobular hyperplasia. Complex sclerosing lesion (radial scar) located adjacent to a healing prior biopsy site and a separate intraductal papilloma containing focal florid duct hyperplasia.\par \par \par LONNY MELENDREZ is a 76 year old female patient who presents today in follow up for left breast radial scar, focal ALH and intraductal papilloma.\par Since her last visit, she has no new breast related complaints. \par Imaging with a left breast unilateral diagnostic mammogram was done on 12/09/2021, which revealed there are scattered areas of fibroglandular density. Postsurgical architectural distortion is at the 6:00 axis of the left breast, posterior depth. Benign-appearing calcifications are present in the left breast.\par BI-RADS Category 2: Benign .\par \par INTERVAL HISTORY 9/8/22teodoro Ordonez is here for her six months follow up visit \par She has no breast related complaints at this time.  She denies any breast pain, has not palpated any new palpable masses in either breast and denies any nipple discharge or retraction.\par \par Her imaging is as follows;\par 06/17/2022 b/l mammo \par - scattered areas of fibroglandular density.\par -an area of benign architectural distortion corresponding to the site of surgery seen in the left breast.\par BIRADS2\par \par

## 2022-10-11 ENCOUNTER — OUTPATIENT (OUTPATIENT)
Dept: OUTPATIENT SERVICES | Facility: HOSPITAL | Age: 77
LOS: 1 days | Discharge: HOME | End: 2022-10-11

## 2022-10-11 DIAGNOSIS — M25.531 PAIN IN RIGHT WRIST: ICD-10-CM

## 2022-10-11 DIAGNOSIS — M79.641 PAIN IN RIGHT HAND: ICD-10-CM

## 2022-10-11 DIAGNOSIS — Z98.890 OTHER SPECIFIED POSTPROCEDURAL STATES: Chronic | ICD-10-CM

## 2022-10-11 PROCEDURE — 73130 X-RAY EXAM OF HAND: CPT | Mod: 26,RT

## 2022-10-11 PROCEDURE — 73090 X-RAY EXAM OF FOREARM: CPT | Mod: 26,RT

## 2022-10-11 PROCEDURE — 73110 X-RAY EXAM OF WRIST: CPT | Mod: 26,RT

## 2022-10-14 ENCOUNTER — EMERGENCY (EMERGENCY)
Facility: HOSPITAL | Age: 77
LOS: 0 days | Discharge: HOME | End: 2022-10-14
Attending: EMERGENCY MEDICINE | Admitting: EMERGENCY MEDICINE

## 2022-10-14 VITALS
TEMPERATURE: 97 F | HEIGHT: 65 IN | RESPIRATION RATE: 20 BRPM | HEART RATE: 94 BPM | SYSTOLIC BLOOD PRESSURE: 179 MMHG | OXYGEN SATURATION: 97 % | DIASTOLIC BLOOD PRESSURE: 75 MMHG

## 2022-10-14 DIAGNOSIS — M25.531 PAIN IN RIGHT WRIST: ICD-10-CM

## 2022-10-14 DIAGNOSIS — Z79.84 LONG TERM (CURRENT) USE OF ORAL HYPOGLYCEMIC DRUGS: ICD-10-CM

## 2022-10-14 DIAGNOSIS — E78.5 HYPERLIPIDEMIA, UNSPECIFIED: ICD-10-CM

## 2022-10-14 DIAGNOSIS — Z79.82 LONG TERM (CURRENT) USE OF ASPIRIN: ICD-10-CM

## 2022-10-14 DIAGNOSIS — I10 ESSENTIAL (PRIMARY) HYPERTENSION: ICD-10-CM
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
## 2022-10-14 DIAGNOSIS — E11.9 TYPE 2 DIABETES MELLITUS WITHOUT COMPLICATIONS: ICD-10-CM

## 2022-10-14 DIAGNOSIS — Z98.890 OTHER SPECIFIED POSTPROCEDURAL STATES: Chronic | ICD-10-CM

## 2022-10-14 DIAGNOSIS — S52.501A UNSPECIFIED FRACTURE OF THE LOWER END OF RIGHT RADIUS, INITIAL ENCOUNTER FOR CLOSED FRACTURE: ICD-10-CM

## 2022-10-14 PROCEDURE — 73090 X-RAY EXAM OF FOREARM: CPT | Mod: 26,RT

## 2022-10-14 PROCEDURE — 73110 X-RAY EXAM OF WRIST: CPT | Mod: 26,RT

## 2022-10-14 PROCEDURE — 73080 X-RAY EXAM OF ELBOW: CPT | Mod: 26,RT

## 2022-10-14 PROCEDURE — 25600 CLTX DST RDL FX/EPHYS SEP WO: CPT | Mod: 54

## 2022-10-14 PROCEDURE — 99284 EMERGENCY DEPT VISIT MOD MDM: CPT | Mod: GC,57

## 2022-10-14 NOTE — ED PROVIDER NOTE - CARE PROVIDER_API CALL
Raza Hernandez)  Orthopaedic Surgery  [address] [place] [address]  Phone: [phone]  Fax: [phone]  Follow Up Time: 7-10 Days

## 2022-10-14 NOTE — ED PROVIDER NOTE - PHYSICAL EXAMINATION
CONSTITUTIONAL: well-developed, well-nourished, in no acute distress  SKIN: warm, dry  HEAD: Normocephalic atraumatic  EYES: no conjunctival erythema  ENT: no nasal discharge, airway clear  NECK: full ROM, non-tender  RESP: normal respiratory effort  EXT: moving all extremities spontaneously  NEURO: alert and oriented, grossly unremarkable right hand  4+/5 strength full sensation +2 radial pulse, distal radius head tenderness with some overlying bruises   PSYCH: cooperative, appropriate

## 2022-10-14 NOTE — ED PROVIDER NOTE - ATTENDING CONTRIBUTION TO CARE
77-year-old female to ED status post FOOSH injury 1 week ago.  She did hit her head no LOC was evaluated PMD office.  X-ray of patient noted a fracture sent to ED for evaluation.  Exam as noted

## 2022-10-14 NOTE — ED PROVIDER NOTE - NS ED ROS FT
Constitutional: No fever   Eyes:  No visual changes  Ears:  No hearing changes  Neck: No neck pain  Cardiac:  No chest pain  Respiratory:  No SOB   GI:  No abdominal pain, nausea, or vomiting  :  No dysuria  MS:  No back pain +wrist pain  Neuro:  No headache or weakness.  No LOC  Skin:  No skin rash

## 2022-10-14 NOTE — ED PROVIDER NOTE - BIRTH SEX
normal appearance , without tenderness upon palpation , no deformities , trachea midline, no masses , no JVD.
Female

## 2022-10-14 NOTE — ED PROVIDER NOTE - PATIENT PORTAL LINK FT
You can access the FollowMyHealth Patient Portal offered by [facility] by registering at the following website: [email or web address]. By joining [employer]’s FollowMyHealth portal, you will also be able to view your health information using other applications (apps) compatible with our system.

## 2022-10-14 NOTE — ED PROVIDER NOTE - OBJECTIVE STATEMENT
77F w/ pmhx DM HLD HTN who was sent in from PMD right distal radius fracture. she had a FOOSH 1 week ago on her right wrist. Hit her head but no LOC N V HA AC use. she had minimal pain at wrist so she did not seek care initially. swelling increased so she saw her PMD who order XR and found the fracture. denies decreased sensation right hand and but endorses decreased strength due to pain when engaging hand .

## 2022-10-14 NOTE — ED ADULT TRIAGE NOTE - CHIEF COMPLAINT QUOTE
s/p trip and fall x 1 week ago, +right wrist pain, patient had an outpatient xray showing right wrist fracture

## 2022-10-21 ENCOUNTER — RESULT CHARGE (OUTPATIENT)
Age: 77
End: 2022-10-21

## 2022-10-21 ENCOUNTER — APPOINTMENT (OUTPATIENT)
Dept: ORTHOPEDIC SURGERY | Facility: CLINIC | Age: 77
End: 2022-10-21

## 2022-10-21 VITALS — HEIGHT: 65 IN | BODY MASS INDEX: 28.32 KG/M2 | WEIGHT: 170 LBS

## 2022-10-21 DIAGNOSIS — Z00.00 ENCOUNTER FOR GENERAL ADULT MEDICAL EXAMINATION W/OUT ABNORMAL FINDINGS: ICD-10-CM

## 2022-10-21 PROCEDURE — 73110 X-RAY EXAM OF WRIST: CPT | Mod: 26,RT

## 2022-10-21 PROCEDURE — 29075 APPL CST ELBW FNGR SHORT ARM: CPT | Mod: RT

## 2022-10-21 PROCEDURE — 99203 OFFICE O/P NEW LOW 30 MIN: CPT | Mod: 25

## 2022-10-21 NOTE — DATA REVIEWED
[FreeTextEntry1] :   X-rays taken the office today of her right wrist show an intra-articular impacted distal radius fracture.

## 2022-10-21 NOTE — DISCUSSION/SUMMARY
[de-identified] :   Today I discontinued the sugar-tong splint placed patient in a well fitted, molded, fiberglass short-arm cast.\par She tolerated this well.\par She will continue moving her fingers and elbow.  She was instructed not to get the cast wet.\par I will see her back in 2-3 weeks for cast off, repeat x-ray and evaluation.\par All questions were answered today.

## 2022-10-21 NOTE — PHYSICAL EXAM
[de-identified] : Physical exam of her right wrist:  Mild swelling.  Negative ecchymosis.  No obvious deformity.  Mild pain over the distal radius.  Nontender over the distal ulna.  Negative anatomical snuffbox tenderness.  She has good range of motion of the wrist with mild pain.  Her sensory and motor are intact.

## 2022-10-21 NOTE — HISTORY OF PRESENT ILLNESS
[de-identified] :   Patient is a 77-year-old female here for evaluation of her right wrist.  She states that on 10/07/2022, she fell on her outstretched arm.  She went to [facility] where she had x-rays done was diagnosed with a wrist fracture.

## 2022-11-02 ENCOUNTER — APPOINTMENT (OUTPATIENT)
Dept: ORTHOPEDIC SURGERY | Facility: CLINIC | Age: 77
End: 2022-11-02

## 2022-11-02 VITALS — WEIGHT: 170 LBS | HEIGHT: 65 IN | BODY MASS INDEX: 28.32 KG/M2

## 2022-11-02 PROCEDURE — 73110 X-RAY EXAM OF WRIST: CPT | Mod: RT

## 2022-11-02 NOTE — PHYSICAL EXAM
[de-identified] : Physical exam of her right wrist:  Minimal swelling.  Negative ecchymosis.  No obvious deformity.  Minimal tenderness over the fracture site.  She has good range of motion of the wrist and hand.  Her sensory and motor are intact

## 2022-11-02 NOTE — DATA REVIEWED
[FreeTextEntry1] :  X-rays repeated in the office today for right wrist show a healing impacted intra-articular distal radius fracture.

## 2022-11-02 NOTE — HISTORY OF PRESENT ILLNESS
[de-identified] : Patient is a 77-year-old female here for repeat evaluation of her right wrist.  She is about 3 weeks status post intra-articular displaced and impacted distal radius fracture.  She is doing well.  Today I removed her short-arm cast.

## 2022-11-23 ENCOUNTER — APPOINTMENT (OUTPATIENT)
Dept: ORTHOPEDIC SURGERY | Facility: CLINIC | Age: 77
End: 2022-11-23

## 2022-11-23 VITALS — HEIGHT: 65 IN | WEIGHT: 170 LBS | BODY MASS INDEX: 28.32 KG/M2

## 2022-11-23 DIAGNOSIS — S52.571A OTHER INTRAARTICULAR FRACTURE OF LOWER END OF RIGHT RADIUS, INITIAL ENCOUNTER FOR CLOSED FRACTURE: ICD-10-CM

## 2022-11-23 PROCEDURE — 73110 X-RAY EXAM OF WRIST: CPT | Mod: RT

## 2022-11-23 PROCEDURE — 99213 OFFICE O/P EST LOW 20 MIN: CPT

## 2022-11-23 NOTE — DATA REVIEWED
[FreeTextEntry1] :   X-rays taken the office today for right wrist show a healing intra-articular distal radius fracture.

## 2022-11-23 NOTE — DISCUSSION/SUMMARY
[de-identified] :   Patient is 6 weeks out from the injury.\par The patient understands that fractures take about 6 weeks to heal.  Random residual pain can occur for 6 months to a year.\par She may return to normal activities as tolerated.\par Follow up on an as needed basis. \par All questions were answered in the office today.

## 2022-11-23 NOTE — PHYSICAL EXAM
[de-identified] :   Physical exam of her right wrist:  Negative swelling or ecchymosis.  Nontender over the fracture site.  She has mild pain with range of motion of the wrist.  Mild decreased flexion and extension of the wrist.  She can make a full fist.  Her sensory and motor are intact.

## 2022-11-23 NOTE — HISTORY OF PRESENT ILLNESS
[de-identified] : Patient is a 77-year-old male female here for repeat evaluation of her right wrist.  She is status post a right distal radius fracture.  She is doing well.  She is accompanied by her .

## 2023-06-23 ENCOUNTER — RESULT REVIEW (OUTPATIENT)
Age: 78
End: 2023-06-23

## 2023-06-23 ENCOUNTER — OUTPATIENT (OUTPATIENT)
Dept: OUTPATIENT SERVICES | Facility: HOSPITAL | Age: 78
LOS: 1 days | End: 2023-06-23
Payer: MEDICARE

## 2023-06-23 DIAGNOSIS — Z98.890 OTHER SPECIFIED POSTPROCEDURAL STATES: Chronic | ICD-10-CM

## 2023-06-23 DIAGNOSIS — Z00.8 ENCOUNTER FOR OTHER GENERAL EXAMINATION: ICD-10-CM

## 2023-06-23 DIAGNOSIS — Z12.31 ENCOUNTER FOR SCREENING MAMMOGRAM FOR MALIGNANT NEOPLASM OF BREAST: ICD-10-CM

## 2023-06-23 PROCEDURE — 77063 BREAST TOMOSYNTHESIS BI: CPT

## 2023-06-23 PROCEDURE — 77067 SCR MAMMO BI INCL CAD: CPT | Mod: 26

## 2023-06-23 PROCEDURE — 77067 SCR MAMMO BI INCL CAD: CPT

## 2023-06-23 PROCEDURE — 77063 BREAST TOMOSYNTHESIS BI: CPT | Mod: 26

## 2023-06-24 DIAGNOSIS — Z12.31 ENCOUNTER FOR SCREENING MAMMOGRAM FOR MALIGNANT NEOPLASM OF BREAST: ICD-10-CM

## 2023-06-26 ENCOUNTER — APPOINTMENT (OUTPATIENT)
Dept: BREAST CENTER | Facility: CLINIC | Age: 78
End: 2023-06-26
Payer: MEDICARE

## 2023-06-26 DIAGNOSIS — N64.89 OTHER SPECIFIED DISORDERS OF BREAST: ICD-10-CM

## 2023-06-26 DIAGNOSIS — N60.99 UNSPECIFIED BENIGN MAMMARY DYSPLASIA OF UNSPECIFIED BREAST: ICD-10-CM

## 2023-06-26 DIAGNOSIS — D24.9 BENIGN NEOPLASM OF UNSPECIFIED BREAST: ICD-10-CM

## 2023-06-26 PROCEDURE — 99212 OFFICE O/P EST SF 10 MIN: CPT

## 2023-06-26 NOTE — DATA REVIEWED
[FreeTextEntry1] : ACC: [id number]     EXAM:  MG MAMMO SCREEN W SAVITA BI#   ORDERED BY: ANGY RUSSO\par \par PROCEDURE DATE:  06/23/2023\par \par \par \par INTERPRETATION:  HISTORY:\par Bilateral MG MAMMO SCREEN W SAVITA BI# was performed. Patient is 78 years old and is seen for screening. The patient has no personal history of cancer.  The patient has the following family history of breast cancer:  female cousin, breast cancer.\par \par RISK ASSESSMENT:\par NCI Lifetime Risk: 4.8\par Tyrer-Cuzick Lifetime Risk: 3.4\par \par CLINICAL BREAST EXAM:\par The patient reports their last clinical breast exam was performed within the past year.\par \par COMPARISON STUDIES:\par The present examination has been compared to prior imaging studies performed at [facility] on 06/21/2021, 12/09/2021 and 06/17/2022.\par \par MAMMOGRAM FINDINGS:\par Mammography was performed including the following views: bilateral craniocaudal with tomosynthesis, bilateral mediolateral oblique with tomosynthesis.  The examination includes digital synthetic 2D and digital tomosynthesis 3D images. Additional imaging analysis was performed using CAD (computer-aided detection) software.\par \par There are scattered areas of fibroglandular density.\par \par There is an area of benign architectural distortion corresponding to the site of surgery seen in the left breast.\par \par No suspicious mass, grouping of calcifications, or other abnormality is identified.\par \par IMPRESSION:\par There is no mammographic evidence of malignancy.\par \par RECOMMENDATION:\par Unless otherwise indicated by clinical findings, annual screening mammography recommended.\par \par ASSESSMENT:\par BI-RADS Category 2:  Benign\par

## 2023-06-26 NOTE — PHYSICAL EXAM
[Normocephalic] : normocephalic [Atraumatic] : atraumatic [EOMI] : extra ocular movement intact [Examined in the supine and seated position] : examined in the supine and seated position [Symmetrical] : symmetrical [No dominant masses] : no dominant masses in right breast  [No dominant masses] : no dominant masses left breast [No Nipple Retraction] : no left nipple retraction [No Nipple Discharge] : no left nipple discharge [No Axillary Lymphadenopathy] : no left axillary lymphadenopathy [No Edema] : no edema [No Rashes] : no rashes [No Ulceration] : no ulceration [de-identified] : On physical exam, there are no discrete masses in either breast or axilla. There is no nipple discharge or inversion bilaterally. There are no skin changes bilaterally.\par \par

## 2023-06-26 NOTE — HISTORY OF PRESENT ILLNESS
[FreeTextEntry1] : PCP: Dr. Ashley Tim\par \par s/p US Guided Core Bx - 04/19/2021:\par Left, 5:00 N5, 1.6cm: (stoplight)\par - Complex sclerosing lesion (radial scar) containing microcalcifications.\par \par (-) family hx - breast / ovarian cancer.\par \par Annika Model Risk Assessment: (recalculated w/ surgical path)\par 5 yr - 4.9% \par Life - 10.4%\par \par s/p Left Breast NLOC - 06/21/2021 = Focal atypical lobular hyperplasia. Complex sclerosing lesion (radial scar) located adjacent to a healing prior biopsy site and a separate intraductal papilloma containing focal florid duct hyperplasia.\par \par \par LONNY MELENDREZ is a 76 year old female patient who presents today in follow up for left breast radial scar, focal ALH and intraductal papilloma.\par Since her last visit, she has no new breast related complaints. \par Imaging with a left breast unilateral diagnostic mammogram was done on 12/09/2021, which revealed there are scattered areas of fibroglandular density. Postsurgical architectural distortion is at the 6:00 axis of the left breast, posterior depth. Benign-appearing calcifications are present in the left breast.\par BI-RADS Category 2: Benign .\par \par INTERVAL HISTORY 9/8/22\par Lonny is here for her six months follow up visit \par She has no breast related complaints at this time.  She denies any breast pain, has not palpated any new palpable masses in either breast and denies any nipple discharge or retraction.\par \par Her imaging is as follows;\par 06/17/2022 b/l mammo \par - scattered areas of fibroglandular density.\par -an area of benign architectural distortion corresponding to the site of surgery seen in the left breast.\par BIRADS2\par \par INTERVAL HISTORY 6/26/23\par Lonny is here for her one year follow up visit \par She has no breast related complaints at this time.  She denies any breast pain, has not palpated any new palpable masses in either breast and denies any nipple discharge or retraction.\par \par Her imaging is as follows;\par 06/23/2023 b/l mammo\par -scattered areas of fibroglandular density\par - an area of benign architectural distortion corresponding to the site of surgery seen in the left breast\par BIRADS2\par

## 2023-06-26 NOTE — ASSESSMENT
[FreeTextEntry1] : LONNY is a aaron 78 year old patient who presented today in follow up for a history of left breast radial scar, focal ALH and intraductal papilloma.  \par She has been doing well with no new breast related complaints. \par \par \par Her imaging is as follows;\par 06/23/2023 b/l mammo\par -scattered areas of fibroglandular density\par - an area of benign architectural distortion corresponding to the site of surgery seen in the left breast\par BIRADS2\par \par Physical exam was unrevealing today.\par \par PLAN:\par - bilateral screening mammogram will be due in June 24, 2024, and that will be scheduled today. \par -She will return for follow-up and clinical breast exam in June 2024 as well.\par \par \par All of her questions were appropriately answered.\par She knows to call with any concerns.\par \par \par \par \par \par

## 2023-10-20 ENCOUNTER — INPATIENT (INPATIENT)
Facility: HOSPITAL | Age: 78
LOS: 1 days | Discharge: HOME | DRG: 195 | End: 2023-10-22
Attending: INTERNAL MEDICINE | Admitting: INTERNAL MEDICINE
Payer: MEDICARE

## 2023-10-20 VITALS
SYSTOLIC BLOOD PRESSURE: 133 MMHG | HEART RATE: 115 BPM | OXYGEN SATURATION: 99 % | DIASTOLIC BLOOD PRESSURE: 77 MMHG | TEMPERATURE: 102 F | RESPIRATION RATE: 18 BRPM | HEIGHT: 65 IN | WEIGHT: 173.94 LBS

## 2023-10-20 DIAGNOSIS — Z98.890 OTHER SPECIFIED POSTPROCEDURAL STATES: Chronic | ICD-10-CM

## 2023-10-20 DIAGNOSIS — J18.9 PNEUMONIA, UNSPECIFIED ORGANISM: ICD-10-CM

## 2023-10-20 LAB
ALBUMIN SERPL ELPH-MCNC: 4.3 G/DL — SIGNIFICANT CHANGE UP (ref 3.5–5.2)
ALBUMIN SERPL ELPH-MCNC: 4.3 G/DL — SIGNIFICANT CHANGE UP (ref 3.5–5.2)
ALP SERPL-CCNC: 68 U/L — SIGNIFICANT CHANGE UP (ref 30–115)
ALP SERPL-CCNC: 68 U/L — SIGNIFICANT CHANGE UP (ref 30–115)
ALT FLD-CCNC: 12 U/L — SIGNIFICANT CHANGE UP (ref 0–41)
ALT FLD-CCNC: 12 U/L — SIGNIFICANT CHANGE UP (ref 0–41)
ANION GAP SERPL CALC-SCNC: 12 MMOL/L — SIGNIFICANT CHANGE UP (ref 7–14)
ANION GAP SERPL CALC-SCNC: 12 MMOL/L — SIGNIFICANT CHANGE UP (ref 7–14)
APTT BLD: 27.4 SEC — SIGNIFICANT CHANGE UP (ref 27–39.2)
APTT BLD: 27.4 SEC — SIGNIFICANT CHANGE UP (ref 27–39.2)
AST SERPL-CCNC: 17 U/L — SIGNIFICANT CHANGE UP (ref 0–41)
AST SERPL-CCNC: 17 U/L — SIGNIFICANT CHANGE UP (ref 0–41)
BASE EXCESS BLDV CALC-SCNC: 3.2 MMOL/L — HIGH (ref -2–3)
BASE EXCESS BLDV CALC-SCNC: 3.2 MMOL/L — HIGH (ref -2–3)
BASOPHILS # BLD AUTO: 0 K/UL — SIGNIFICANT CHANGE UP (ref 0–0.2)
BASOPHILS # BLD AUTO: 0 K/UL — SIGNIFICANT CHANGE UP (ref 0–0.2)
BASOPHILS NFR BLD AUTO: 0 % — SIGNIFICANT CHANGE UP (ref 0–1)
BASOPHILS NFR BLD AUTO: 0 % — SIGNIFICANT CHANGE UP (ref 0–1)
BILIRUB SERPL-MCNC: 0.8 MG/DL — SIGNIFICANT CHANGE UP (ref 0.2–1.2)
BILIRUB SERPL-MCNC: 0.8 MG/DL — SIGNIFICANT CHANGE UP (ref 0.2–1.2)
BUN SERPL-MCNC: 12 MG/DL — SIGNIFICANT CHANGE UP (ref 10–20)
BUN SERPL-MCNC: 12 MG/DL — SIGNIFICANT CHANGE UP (ref 10–20)
CA-I SERPL-SCNC: 1.12 MMOL/L — LOW (ref 1.15–1.33)
CA-I SERPL-SCNC: 1.12 MMOL/L — LOW (ref 1.15–1.33)
CALCIUM SERPL-MCNC: 8.9 MG/DL — SIGNIFICANT CHANGE UP (ref 8.4–10.5)
CALCIUM SERPL-MCNC: 8.9 MG/DL — SIGNIFICANT CHANGE UP (ref 8.4–10.5)
CHLORIDE SERPL-SCNC: 100 MMOL/L — SIGNIFICANT CHANGE UP (ref 98–110)
CHLORIDE SERPL-SCNC: 100 MMOL/L — SIGNIFICANT CHANGE UP (ref 98–110)
CO2 SERPL-SCNC: 23 MMOL/L — SIGNIFICANT CHANGE UP (ref 17–32)
CO2 SERPL-SCNC: 23 MMOL/L — SIGNIFICANT CHANGE UP (ref 17–32)
CREAT SERPL-MCNC: 0.7 MG/DL — SIGNIFICANT CHANGE UP (ref 0.7–1.5)
CREAT SERPL-MCNC: 0.7 MG/DL — SIGNIFICANT CHANGE UP (ref 0.7–1.5)
EGFR: 89 ML/MIN/1.73M2 — SIGNIFICANT CHANGE UP
EGFR: 89 ML/MIN/1.73M2 — SIGNIFICANT CHANGE UP
EOSINOPHIL # BLD AUTO: 0 K/UL — SIGNIFICANT CHANGE UP (ref 0–0.7)
EOSINOPHIL # BLD AUTO: 0 K/UL — SIGNIFICANT CHANGE UP (ref 0–0.7)
EOSINOPHIL NFR BLD AUTO: 0 % — SIGNIFICANT CHANGE UP (ref 0–8)
EOSINOPHIL NFR BLD AUTO: 0 % — SIGNIFICANT CHANGE UP (ref 0–8)
FLUAV AG NPH QL: SIGNIFICANT CHANGE UP
FLUAV AG NPH QL: SIGNIFICANT CHANGE UP
FLUBV AG NPH QL: SIGNIFICANT CHANGE UP
FLUBV AG NPH QL: SIGNIFICANT CHANGE UP
GAS PNL BLDV: 132 MMOL/L — LOW (ref 136–145)
GAS PNL BLDV: 132 MMOL/L — LOW (ref 136–145)
GAS PNL BLDV: SIGNIFICANT CHANGE UP
GAS PNL BLDV: SIGNIFICANT CHANGE UP
GLUCOSE BLDC GLUCOMTR-MCNC: 134 MG/DL — HIGH (ref 70–99)
GLUCOSE BLDC GLUCOMTR-MCNC: 134 MG/DL — HIGH (ref 70–99)
GLUCOSE SERPL-MCNC: 206 MG/DL — HIGH (ref 70–99)
GLUCOSE SERPL-MCNC: 206 MG/DL — HIGH (ref 70–99)
HCO3 BLDV-SCNC: 27 MMOL/L — SIGNIFICANT CHANGE UP (ref 22–29)
HCO3 BLDV-SCNC: 27 MMOL/L — SIGNIFICANT CHANGE UP (ref 22–29)
HCT VFR BLD CALC: 36.3 % — LOW (ref 37–47)
HCT VFR BLD CALC: 36.3 % — LOW (ref 37–47)
HCT VFR BLDA CALC: 32 % — LOW (ref 39–51)
HCT VFR BLDA CALC: 32 % — LOW (ref 39–51)
HGB BLD CALC-MCNC: 10.7 G/DL — LOW (ref 12.6–17.4)
HGB BLD CALC-MCNC: 10.7 G/DL — LOW (ref 12.6–17.4)
HGB BLD-MCNC: 12.3 G/DL — SIGNIFICANT CHANGE UP (ref 12–16)
HGB BLD-MCNC: 12.3 G/DL — SIGNIFICANT CHANGE UP (ref 12–16)
INR BLD: 1.05 RATIO — SIGNIFICANT CHANGE UP (ref 0.65–1.3)
INR BLD: 1.05 RATIO — SIGNIFICANT CHANGE UP (ref 0.65–1.3)
LACTATE BLDV-MCNC: 0.8 MMOL/L — SIGNIFICANT CHANGE UP (ref 0.5–2)
LACTATE BLDV-MCNC: 0.8 MMOL/L — SIGNIFICANT CHANGE UP (ref 0.5–2)
LACTATE SERPL-SCNC: 1.7 MMOL/L — SIGNIFICANT CHANGE UP (ref 0.7–2)
LACTATE SERPL-SCNC: 1.7 MMOL/L — SIGNIFICANT CHANGE UP (ref 0.7–2)
LYMPHOCYTES # BLD AUTO: 0.18 K/UL — LOW (ref 1.2–3.4)
LYMPHOCYTES # BLD AUTO: 0.18 K/UL — LOW (ref 1.2–3.4)
LYMPHOCYTES # BLD AUTO: 2.6 % — LOW (ref 20.5–51.1)
LYMPHOCYTES # BLD AUTO: 2.6 % — LOW (ref 20.5–51.1)
MANUAL SMEAR VERIFICATION: SIGNIFICANT CHANGE UP
MANUAL SMEAR VERIFICATION: SIGNIFICANT CHANGE UP
MCHC RBC-ENTMCNC: 29.2 PG — SIGNIFICANT CHANGE UP (ref 27–31)
MCHC RBC-ENTMCNC: 29.2 PG — SIGNIFICANT CHANGE UP (ref 27–31)
MCHC RBC-ENTMCNC: 33.9 G/DL — SIGNIFICANT CHANGE UP (ref 32–37)
MCHC RBC-ENTMCNC: 33.9 G/DL — SIGNIFICANT CHANGE UP (ref 32–37)
MCV RBC AUTO: 86.2 FL — SIGNIFICANT CHANGE UP (ref 81–99)
MCV RBC AUTO: 86.2 FL — SIGNIFICANT CHANGE UP (ref 81–99)
MONOCYTES # BLD AUTO: 0.36 K/UL — SIGNIFICANT CHANGE UP (ref 0.1–0.6)
MONOCYTES # BLD AUTO: 0.36 K/UL — SIGNIFICANT CHANGE UP (ref 0.1–0.6)
MONOCYTES NFR BLD AUTO: 5.2 % — SIGNIFICANT CHANGE UP (ref 1.7–9.3)
MONOCYTES NFR BLD AUTO: 5.2 % — SIGNIFICANT CHANGE UP (ref 1.7–9.3)
NEUTROPHILS # BLD AUTO: 6.37 K/UL — SIGNIFICANT CHANGE UP (ref 1.4–6.5)
NEUTROPHILS # BLD AUTO: 6.37 K/UL — SIGNIFICANT CHANGE UP (ref 1.4–6.5)
NEUTROPHILS NFR BLD AUTO: 91.3 % — HIGH (ref 42.2–75.2)
NEUTROPHILS NFR BLD AUTO: 91.3 % — HIGH (ref 42.2–75.2)
NEUTS BAND # BLD: 0.9 % — SIGNIFICANT CHANGE UP (ref 0–6)
NEUTS BAND # BLD: 0.9 % — SIGNIFICANT CHANGE UP (ref 0–6)
OVALOCYTES BLD QL SMEAR: SLIGHT — SIGNIFICANT CHANGE UP
OVALOCYTES BLD QL SMEAR: SLIGHT — SIGNIFICANT CHANGE UP
PCO2 BLDV: 37 MMHG — LOW (ref 39–42)
PCO2 BLDV: 37 MMHG — LOW (ref 39–42)
PH BLDV: 7.47 — HIGH (ref 7.32–7.43)
PH BLDV: 7.47 — HIGH (ref 7.32–7.43)
PLAT MORPH BLD: NORMAL — SIGNIFICANT CHANGE UP
PLAT MORPH BLD: NORMAL — SIGNIFICANT CHANGE UP
PLATELET # BLD AUTO: 164 K/UL — SIGNIFICANT CHANGE UP (ref 130–400)
PLATELET # BLD AUTO: 164 K/UL — SIGNIFICANT CHANGE UP (ref 130–400)
PMV BLD: 9.3 FL — SIGNIFICANT CHANGE UP (ref 7.4–10.4)
PMV BLD: 9.3 FL — SIGNIFICANT CHANGE UP (ref 7.4–10.4)
PO2 BLDV: 52 MMHG — SIGNIFICANT CHANGE UP
PO2 BLDV: 52 MMHG — SIGNIFICANT CHANGE UP
POIKILOCYTOSIS BLD QL AUTO: SLIGHT — SIGNIFICANT CHANGE UP
POIKILOCYTOSIS BLD QL AUTO: SLIGHT — SIGNIFICANT CHANGE UP
POLYCHROMASIA BLD QL SMEAR: SLIGHT — SIGNIFICANT CHANGE UP
POLYCHROMASIA BLD QL SMEAR: SLIGHT — SIGNIFICANT CHANGE UP
POTASSIUM BLDV-SCNC: 2.7 MMOL/L — CRITICAL LOW (ref 3.5–5.1)
POTASSIUM BLDV-SCNC: 2.7 MMOL/L — CRITICAL LOW (ref 3.5–5.1)
POTASSIUM SERPL-MCNC: 3.2 MMOL/L — LOW (ref 3.5–5)
POTASSIUM SERPL-MCNC: 3.2 MMOL/L — LOW (ref 3.5–5)
POTASSIUM SERPL-SCNC: 3.2 MMOL/L — LOW (ref 3.5–5)
POTASSIUM SERPL-SCNC: 3.2 MMOL/L — LOW (ref 3.5–5)
PROT SERPL-MCNC: 6.3 G/DL — SIGNIFICANT CHANGE UP (ref 6–8)
PROT SERPL-MCNC: 6.3 G/DL — SIGNIFICANT CHANGE UP (ref 6–8)
PROTHROM AB SERPL-ACNC: 12 SEC — SIGNIFICANT CHANGE UP (ref 9.95–12.87)
PROTHROM AB SERPL-ACNC: 12 SEC — SIGNIFICANT CHANGE UP (ref 9.95–12.87)
RBC # BLD: 4.21 M/UL — SIGNIFICANT CHANGE UP (ref 4.2–5.4)
RBC # BLD: 4.21 M/UL — SIGNIFICANT CHANGE UP (ref 4.2–5.4)
RBC # FLD: 13.3 % — SIGNIFICANT CHANGE UP (ref 11.5–14.5)
RBC # FLD: 13.3 % — SIGNIFICANT CHANGE UP (ref 11.5–14.5)
RBC BLD AUTO: ABNORMAL
RBC BLD AUTO: ABNORMAL
RSV RNA NPH QL NAA+NON-PROBE: SIGNIFICANT CHANGE UP
RSV RNA NPH QL NAA+NON-PROBE: SIGNIFICANT CHANGE UP
SAO2 % BLDV: 86.4 % — SIGNIFICANT CHANGE UP
SAO2 % BLDV: 86.4 % — SIGNIFICANT CHANGE UP
SARS-COV-2 RNA SPEC QL NAA+PROBE: SIGNIFICANT CHANGE UP
SARS-COV-2 RNA SPEC QL NAA+PROBE: SIGNIFICANT CHANGE UP
SODIUM SERPL-SCNC: 135 MMOL/L — SIGNIFICANT CHANGE UP (ref 135–146)
SODIUM SERPL-SCNC: 135 MMOL/L — SIGNIFICANT CHANGE UP (ref 135–146)
WBC # BLD: 6.91 K/UL — SIGNIFICANT CHANGE UP (ref 4.8–10.8)
WBC # BLD: 6.91 K/UL — SIGNIFICANT CHANGE UP (ref 4.8–10.8)
WBC # FLD AUTO: 6.91 K/UL — SIGNIFICANT CHANGE UP (ref 4.8–10.8)
WBC # FLD AUTO: 6.91 K/UL — SIGNIFICANT CHANGE UP (ref 4.8–10.8)

## 2023-10-20 PROCEDURE — 99285 EMERGENCY DEPT VISIT HI MDM: CPT | Mod: FS

## 2023-10-20 PROCEDURE — 93010 ELECTROCARDIOGRAM REPORT: CPT

## 2023-10-20 PROCEDURE — 83036 HEMOGLOBIN GLYCOSYLATED A1C: CPT

## 2023-10-20 PROCEDURE — 86803 HEPATITIS C AB TEST: CPT

## 2023-10-20 PROCEDURE — 71045 X-RAY EXAM CHEST 1 VIEW: CPT | Mod: 26

## 2023-10-20 PROCEDURE — 83735 ASSAY OF MAGNESIUM: CPT

## 2023-10-20 PROCEDURE — 99222 1ST HOSP IP/OBS MODERATE 55: CPT

## 2023-10-20 PROCEDURE — 80053 COMPREHEN METABOLIC PANEL: CPT

## 2023-10-20 PROCEDURE — 82962 GLUCOSE BLOOD TEST: CPT

## 2023-10-20 PROCEDURE — 85025 COMPLETE CBC W/AUTO DIFF WBC: CPT

## 2023-10-20 PROCEDURE — 36415 COLL VENOUS BLD VENIPUNCTURE: CPT

## 2023-10-20 RX ORDER — DEXTROSE 50 % IN WATER 50 %
15 SYRINGE (ML) INTRAVENOUS ONCE
Refills: 0 | Status: DISCONTINUED | OUTPATIENT
Start: 2023-10-20 | End: 2023-10-22

## 2023-10-20 RX ORDER — SODIUM CHLORIDE 9 MG/ML
1000 INJECTION, SOLUTION INTRAVENOUS
Refills: 0 | Status: DISCONTINUED | OUTPATIENT
Start: 2023-10-20 | End: 2023-10-22

## 2023-10-20 RX ORDER — POTASSIUM CHLORIDE 20 MEQ
20 PACKET (EA) ORAL ONCE
Refills: 0 | Status: COMPLETED | OUTPATIENT
Start: 2023-10-20 | End: 2023-10-20

## 2023-10-20 RX ORDER — GLUCAGON INJECTION, SOLUTION 0.5 MG/.1ML
1 INJECTION, SOLUTION SUBCUTANEOUS ONCE
Refills: 0 | Status: DISCONTINUED | OUTPATIENT
Start: 2023-10-20 | End: 2023-10-22

## 2023-10-20 RX ORDER — INSULIN LISPRO 100/ML
VIAL (ML) SUBCUTANEOUS
Refills: 0 | Status: DISCONTINUED | OUTPATIENT
Start: 2023-10-20 | End: 2023-10-22

## 2023-10-20 RX ORDER — ENOXAPARIN SODIUM 100 MG/ML
40 INJECTION SUBCUTANEOUS EVERY 24 HOURS
Refills: 0 | Status: DISCONTINUED | OUTPATIENT
Start: 2023-10-20 | End: 2023-10-22

## 2023-10-20 RX ORDER — INFLUENZA VIRUS VACCINE 15; 15; 15; 15 UG/.5ML; UG/.5ML; UG/.5ML; UG/.5ML
0.7 SUSPENSION INTRAMUSCULAR ONCE
Refills: 0 | Status: DISCONTINUED | OUTPATIENT
Start: 2023-10-20 | End: 2023-10-22

## 2023-10-20 RX ORDER — DEXTROSE 50 % IN WATER 50 %
25 SYRINGE (ML) INTRAVENOUS ONCE
Refills: 0 | Status: DISCONTINUED | OUTPATIENT
Start: 2023-10-20 | End: 2023-10-22

## 2023-10-20 RX ORDER — ACETAMINOPHEN 500 MG
975 TABLET ORAL ONCE
Refills: 0 | Status: COMPLETED | OUTPATIENT
Start: 2023-10-20 | End: 2023-10-20

## 2023-10-20 RX ORDER — SIMVASTATIN 20 MG/1
20 TABLET, FILM COATED ORAL AT BEDTIME
Refills: 0 | Status: DISCONTINUED | OUTPATIENT
Start: 2023-10-20 | End: 2023-10-22

## 2023-10-20 RX ORDER — AMLODIPINE BESYLATE 2.5 MG/1
5 TABLET ORAL DAILY
Refills: 0 | Status: DISCONTINUED | OUTPATIENT
Start: 2023-10-20 | End: 2023-10-22

## 2023-10-20 RX ORDER — DEXTROSE 50 % IN WATER 50 %
12.5 SYRINGE (ML) INTRAVENOUS ONCE
Refills: 0 | Status: DISCONTINUED | OUTPATIENT
Start: 2023-10-20 | End: 2023-10-22

## 2023-10-20 RX ORDER — SODIUM CHLORIDE 9 MG/ML
1000 INJECTION INTRAMUSCULAR; INTRAVENOUS; SUBCUTANEOUS ONCE
Refills: 0 | Status: COMPLETED | OUTPATIENT
Start: 2023-10-20 | End: 2023-10-20

## 2023-10-20 RX ORDER — ASPIRIN/CALCIUM CARB/MAGNESIUM 324 MG
81 TABLET ORAL
Qty: 0 | Refills: 0 | DISCHARGE

## 2023-10-20 RX ORDER — AMLODIPINE BESYLATE 2.5 MG/1
1 TABLET ORAL
Refills: 0 | DISCHARGE

## 2023-10-20 RX ORDER — LOSARTAN POTASSIUM 100 MG/1
100 TABLET, FILM COATED ORAL DAILY
Refills: 0 | Status: DISCONTINUED | OUTPATIENT
Start: 2023-10-20 | End: 2023-10-22

## 2023-10-20 RX ADMIN — Medication 975 MILLIGRAM(S): at 11:15

## 2023-10-20 RX ADMIN — Medication 20 MILLIEQUIVALENT(S): at 17:32

## 2023-10-20 RX ADMIN — Medication 20 MILLIEQUIVALENT(S): at 15:32

## 2023-10-20 RX ADMIN — SODIUM CHLORIDE 1000 MILLILITER(S): 9 INJECTION INTRAMUSCULAR; INTRAVENOUS; SUBCUTANEOUS at 10:58

## 2023-10-20 RX ADMIN — SIMVASTATIN 20 MILLIGRAM(S): 20 TABLET, FILM COATED ORAL at 21:48

## 2023-10-20 NOTE — H&P ADULT - HISTORY OF PRESENT ILLNESS
Case of 78-year-old female with pmhx of HTN, DM, DLD  presents to ED with weakness and fever since last night.    +  with cold and congestion over last week.    pt denies cp, sob, cough, abd pain, nausea, vomiting, diarrhea, urinary symptoms, calf pain or swelling,    In the ED, vitals were · /77 mm Hg,  Heart [id number] /min · Temp (F) 102.2 Degrees F SpO2 (%)99 % on room air  Labs grossly unremarkable except for potassium 3.2  CXR no evidence of infection  RVP negative    Given levaquin, 20meq potassium, and admitted     Case of 78-year-old female with pmhx of HTN, DM, DLD  presents to ED with weakness and fever since last night.    She says that yesterday she was feeling weak, and earlier today she had a fever undocumented at home.  No cough, sore throat, chest pain, diarrhea, urinary symptoms, recent travel.    Of note, her  has been having mild cough and congestion over last week.    In the ED, vitals were · /77 mm Hg,  Heart [id number] /min · Temp (F) 102.2 Degrees F SpO2 (%) 99 % on room air  Labs grossly unremarkable except for potassium 3.2  CXR no evidence of infection  RVP negative    Given levaquin, 20meq potassium, and admitted

## 2023-10-20 NOTE — ED ADULT TRIAGE NOTE - BP NONINVASIVE DIASTOLIC (MM HG)
Bedside shift report received from KATIA Veronica. Communication board has been updated. NAD noted. Will continue to monitor. Pt is asleep on right side but easily aroused. Pt is pleasant.   78

## 2023-10-20 NOTE — H&P ADULT - ASSESSMENT
Case of 78-year-old female with pmhx of HTN, DM, DLD  presents to ED with weakness and fever since last night.    #Fever  #Tachycardia  - Possible viral pneumonia  - RVP negative  - No leukocytosis  Plan  - Repeat RVP  - Follow up blood Cx  - Get UA  - Procal  - Monitor off Abx    #HTN  - C/w amlodipine  - C/w losartan  - Hold thiazide for now    #DM  #DLD  - Hold oral meds  - sliding scale for now  - C/w statin    #DVT prophylaxis  #GI prophylaxis  #Activity  #Diet   Case of 78-year-old female with pmhx of HTN, DM, DLD  presents to ED with weakness and fever since last night.    #Fever  #Tachycardia  #Possible URTI  - RVP negative  - No leukocytosis  Plan  - Repeat full RVP  - Follow up blood Cx  - Get UA  - Procal  - Monitor off Abx    #HTN  #Hypokalemia  - C/w amlodipine  - C/w losartan  - Hold thiazide for now and replete potassium    #DM  #DLD  - Hold oral meds  - sliding scale for now  - C/w statin    #DVT prophylaxis  #GI prophylaxis  #Activity  #Diet   Case of 78-year-old female with pmhx of HTN, DM, DLD  presents to ED with weakness and fever since last night.    #Fever  #Tachycardia  #Possible URTI  - RVP negative  - No leukocytosis  Plan  - Repeat full RVP  - Follow up blood Cx  - Monitor off Abx    #HTN  #Hypokalemia  - C/w amlodipine  - C/w losartan  - Hold thiazide for now and replete potassium    #DM  #DLD  - A1c  - Hold metformin  - Sliding scale for now  - C/w statin    #DVT prophylaxis: lovenox  #GI prophylaxis: not needed  #Activity: IAT  #Diet: DASH, diabetic   Case of 78-year-old female with pmhx of HTN, DM, DLD  presents to ED with weakness and fever since last night.    #Fever  #Tachycardia  #Possible viral infection  - RVP negative  - No leukocytosis  - No clear focus for fever yet, could be early vital URTI infection although no clear respiratory symptoms yet  Plan  - Repeat full RVP  - Follow up blood Cx  - Monitor off Abx    #HTN  #Hypokalemia  - C/w amlodipine  - C/w losartan  - Hold thiazide for now and replete potassium    #DM  #DLD  - A1c  - Hold metformin  - Sliding scale for now  - C/w statin    #DVT prophylaxis: lovenox  #GI prophylaxis: not needed  #Activity: IAT  #Diet: DASH, diabetic

## 2023-10-20 NOTE — PATIENT PROFILE ADULT - FALL HARM RISK - HARM RISK INTERVENTIONS

## 2023-10-20 NOTE — ED PROVIDER NOTE - OBJECTIVE STATEMENT
78 year old female with pmhx of htn hld and dm presents to ed with weakness and fever since last night. +  with cold and congestion over last week. pt denies cp, sob, cough, abd pain, nausea, vomiting, diarrhea, urinary symptoms, calf pain or swelling, recent travel or sick contacts.

## 2023-10-20 NOTE — H&P ADULT - NSHPLABSRESULTS_GEN_ALL_CORE
VITAL SIGNS: Last 24 Hours  T(C): 37.2 (20 Oct 2023 13:06), Max: 39 (20 Oct 2023 10:16)  T(F): 99 (20 Oct 2023 13:06), Max: 102.2 (20 Oct 2023 10:16)  HR: 99 (20 Oct 2023 13:06) (99 - 115)  BP: 118/55 (20 Oct 2023 13:06) (118/55 - 133/77)  BP(mean): --  RR: 95 (20 Oct 2023 13:06) (18 - 95)  SpO2: 99% (20 Oct 2023 10:16) (99% - 99%)    LABS:                        12.3   6.91  )-----------( 164      ( 20 Oct 2023 10:43 )             36.3     10-20    135  |  100  |  12  ----------------------------<  206<H>  3.2<L>   |  23  |  0.7    Ca    8.9      20 Oct 2023 10:43    TPro  6.3  /  Alb  4.3  /  TBili  0.8  /  DBili  x   /  AST  17  /  ALT  12  /  AlkPhos  68  10-20    PT/INR - ( 20 Oct 2023 10:43 )   PT: 12.00 sec;   INR: 1.05 ratio         PTT - ( 20 Oct 2023 10:43 )  PTT:27.4 sec  Urinalysis Basic - ( 20 Oct 2023 10:43 )    Color: x / Appearance: x / SG: x / pH: x  Gluc: 206 mg/dL / Ketone: x  / Bili: x / Urobili: x   Blood: x / Protein: x / Nitrite: x   Leuk Esterase: x / RBC: x / WBC x   Sq Epi: x / Non Sq Epi: x / Bacteria: x        Lactate, Blood: 1.7 mmol/L (10-20-23 @ 10:43)          RADIOLOGY:

## 2023-10-20 NOTE — ED ADULT NURSE NOTE - NSFALLRISKINTERV_ED_ALL_ED

## 2023-10-20 NOTE — H&P ADULT - NSHPPHYSICALEXAM_GEN_ALL_CORE
PHYSICAL EXAM:  CONSTITUTIONAL: No acute distress, well-developed, well-groomed, AAOx3  HEAD: Atraumatic, normocephalic  EYES: EOM intact, PERRLA, conjunctiva and sclera clear  ENT: Supple, no masses, no thyromegaly, no bruits, no JVD; moist mucous membranes  PULMONARY: Clear to auscultation bilaterally; no wheezes, rales, or rhonchi  CARDIOVASCULAR: Regular rate and rhythm; no murmurs, rubs, or gallops  GASTROINTESTINAL: Soft, non-tender, non-distended; bowel sounds present  MUSCULOSKELETAL: 2+ peripheral pulses; no clubbing, no cyanosis, no edema  NEUROLOGY: non-focal  SKIN: No rashes or lesions; warm and dry PHYSICAL EXAM:  CONSTITUTIONAL: No acute distress, AAOx3  PULMONARY: Clear to auscultation bilaterally; no wheezes, rales, or rhonchi  CARDIOVASCULAR: Regular rate and rhythm; no murmurs, rubs, or gallops  GASTROINTESTINAL: Soft, non-tender, non-distended; bowel sounds present  MUSCULOSKELETAL: 2+ peripheral pulses; no LLE  NEUROLOGY: non-focal  SKIN: No rashes or lesions; warm and dry

## 2023-10-20 NOTE — H&P ADULT - ATTENDING COMMENTS
78-year-old female with pmhx of HTN, DM, DLD  presents to ED with weakness and fever since last night.  # malaise, fever, possible sirs, no leukocytosis , cxr and ua neg, rvp swab neg  check bc,     # DM insulin per protocol   POCT Blood Glucose.: 134 mg/dL (10-20-23 @ 17:16)    # hyperlipidemia simvastatin 20   # hypertension, controlled, hold meds if sbp less than 110     #Progress Note Handoff    Pending :  bc   Family discussion:ramon pt and her  and two daughters at the bedside   Disposition: home when stable   code status full code  pt requires hospitalization

## 2023-10-20 NOTE — ED PROVIDER NOTE - CLINICAL SUMMARY MEDICAL DECISION MAKING FREE TEXT BOX
78-year-old female presents to the ED for weakness and fever since last night.  Sick contacts at home.  Vitals are noted to be febrile and tachycardic.  Physical exam is unremarkable.  Concern for pneumonia so we obtained labs along with x-ray.  X-ray revealed bilateral opacities consistent with multifocal pneumonia.  VBG unremarkable.  Admitted for parenteral antibiotics for pneumonia.  Port score 77.  Patient will have a difficult time of taking care of herself.

## 2023-10-21 ENCOUNTER — TRANSCRIPTION ENCOUNTER (OUTPATIENT)
Age: 78
End: 2023-10-21

## 2023-10-21 LAB
A1C WITH ESTIMATED AVERAGE GLUCOSE RESULT: 6.9 % — HIGH (ref 4–5.6)
A1C WITH ESTIMATED AVERAGE GLUCOSE RESULT: 6.9 % — HIGH (ref 4–5.6)
ALBUMIN SERPL ELPH-MCNC: 3.6 G/DL — SIGNIFICANT CHANGE UP (ref 3.5–5.2)
ALBUMIN SERPL ELPH-MCNC: 3.6 G/DL — SIGNIFICANT CHANGE UP (ref 3.5–5.2)
ALP SERPL-CCNC: 56 U/L — SIGNIFICANT CHANGE UP (ref 30–115)
ALP SERPL-CCNC: 56 U/L — SIGNIFICANT CHANGE UP (ref 30–115)
ALT FLD-CCNC: 12 U/L — SIGNIFICANT CHANGE UP (ref 0–41)
ALT FLD-CCNC: 12 U/L — SIGNIFICANT CHANGE UP (ref 0–41)
ANION GAP SERPL CALC-SCNC: 13 MMOL/L — SIGNIFICANT CHANGE UP (ref 7–14)
ANION GAP SERPL CALC-SCNC: 13 MMOL/L — SIGNIFICANT CHANGE UP (ref 7–14)
AST SERPL-CCNC: 15 U/L — SIGNIFICANT CHANGE UP (ref 0–41)
AST SERPL-CCNC: 15 U/L — SIGNIFICANT CHANGE UP (ref 0–41)
BASOPHILS # BLD AUTO: 0.03 K/UL — SIGNIFICANT CHANGE UP (ref 0–0.2)
BASOPHILS # BLD AUTO: 0.03 K/UL — SIGNIFICANT CHANGE UP (ref 0–0.2)
BASOPHILS NFR BLD AUTO: 0.5 % — SIGNIFICANT CHANGE UP (ref 0–1)
BASOPHILS NFR BLD AUTO: 0.5 % — SIGNIFICANT CHANGE UP (ref 0–1)
BILIRUB SERPL-MCNC: 0.5 MG/DL — SIGNIFICANT CHANGE UP (ref 0.2–1.2)
BILIRUB SERPL-MCNC: 0.5 MG/DL — SIGNIFICANT CHANGE UP (ref 0.2–1.2)
BUN SERPL-MCNC: 7 MG/DL — LOW (ref 10–20)
BUN SERPL-MCNC: 7 MG/DL — LOW (ref 10–20)
CALCIUM SERPL-MCNC: 8.7 MG/DL — SIGNIFICANT CHANGE UP (ref 8.4–10.4)
CALCIUM SERPL-MCNC: 8.7 MG/DL — SIGNIFICANT CHANGE UP (ref 8.4–10.4)
CHLORIDE SERPL-SCNC: 105 MMOL/L — SIGNIFICANT CHANGE UP (ref 98–110)
CHLORIDE SERPL-SCNC: 105 MMOL/L — SIGNIFICANT CHANGE UP (ref 98–110)
CO2 SERPL-SCNC: 23 MMOL/L — SIGNIFICANT CHANGE UP (ref 17–32)
CO2 SERPL-SCNC: 23 MMOL/L — SIGNIFICANT CHANGE UP (ref 17–32)
CREAT SERPL-MCNC: 0.6 MG/DL — LOW (ref 0.7–1.5)
CREAT SERPL-MCNC: 0.6 MG/DL — LOW (ref 0.7–1.5)
EGFR: 92 ML/MIN/1.73M2 — SIGNIFICANT CHANGE UP
EGFR: 92 ML/MIN/1.73M2 — SIGNIFICANT CHANGE UP
EOSINOPHIL # BLD AUTO: 0.07 K/UL — SIGNIFICANT CHANGE UP (ref 0–0.7)
EOSINOPHIL # BLD AUTO: 0.07 K/UL — SIGNIFICANT CHANGE UP (ref 0–0.7)
EOSINOPHIL NFR BLD AUTO: 1.2 % — SIGNIFICANT CHANGE UP (ref 0–8)
EOSINOPHIL NFR BLD AUTO: 1.2 % — SIGNIFICANT CHANGE UP (ref 0–8)
ESTIMATED AVERAGE GLUCOSE: 151 MG/DL — HIGH (ref 68–114)
ESTIMATED AVERAGE GLUCOSE: 151 MG/DL — HIGH (ref 68–114)
GLUCOSE BLDC GLUCOMTR-MCNC: 133 MG/DL — HIGH (ref 70–99)
GLUCOSE BLDC GLUCOMTR-MCNC: 133 MG/DL — HIGH (ref 70–99)
GLUCOSE SERPL-MCNC: 141 MG/DL — HIGH (ref 70–99)
GLUCOSE SERPL-MCNC: 141 MG/DL — HIGH (ref 70–99)
HCT VFR BLD CALC: 35.4 % — LOW (ref 37–47)
HCT VFR BLD CALC: 35.4 % — LOW (ref 37–47)
HCV AB S/CO SERPL IA: 0.04 COI — SIGNIFICANT CHANGE UP
HCV AB S/CO SERPL IA: 0.04 COI — SIGNIFICANT CHANGE UP
HCV AB SERPL-IMP: SIGNIFICANT CHANGE UP
HCV AB SERPL-IMP: SIGNIFICANT CHANGE UP
HGB BLD-MCNC: 11.5 G/DL — LOW (ref 12–16)
HGB BLD-MCNC: 11.5 G/DL — LOW (ref 12–16)
IMM GRANULOCYTES NFR BLD AUTO: 0.4 % — HIGH (ref 0.1–0.3)
IMM GRANULOCYTES NFR BLD AUTO: 0.4 % — HIGH (ref 0.1–0.3)
LYMPHOCYTES # BLD AUTO: 0.67 K/UL — LOW (ref 1.2–3.4)
LYMPHOCYTES # BLD AUTO: 0.67 K/UL — LOW (ref 1.2–3.4)
LYMPHOCYTES # BLD AUTO: 11.9 % — LOW (ref 20.5–51.1)
LYMPHOCYTES # BLD AUTO: 11.9 % — LOW (ref 20.5–51.1)
MAGNESIUM SERPL-MCNC: 1.9 MG/DL — SIGNIFICANT CHANGE UP (ref 1.8–2.4)
MAGNESIUM SERPL-MCNC: 1.9 MG/DL — SIGNIFICANT CHANGE UP (ref 1.8–2.4)
MCHC RBC-ENTMCNC: 28.4 PG — SIGNIFICANT CHANGE UP (ref 27–31)
MCHC RBC-ENTMCNC: 28.4 PG — SIGNIFICANT CHANGE UP (ref 27–31)
MCHC RBC-ENTMCNC: 32.5 G/DL — SIGNIFICANT CHANGE UP (ref 32–37)
MCHC RBC-ENTMCNC: 32.5 G/DL — SIGNIFICANT CHANGE UP (ref 32–37)
MCV RBC AUTO: 87.4 FL — SIGNIFICANT CHANGE UP (ref 81–99)
MCV RBC AUTO: 87.4 FL — SIGNIFICANT CHANGE UP (ref 81–99)
MONOCYTES # BLD AUTO: 0.48 K/UL — SIGNIFICANT CHANGE UP (ref 0.1–0.6)
MONOCYTES # BLD AUTO: 0.48 K/UL — SIGNIFICANT CHANGE UP (ref 0.1–0.6)
MONOCYTES NFR BLD AUTO: 8.5 % — SIGNIFICANT CHANGE UP (ref 1.7–9.3)
MONOCYTES NFR BLD AUTO: 8.5 % — SIGNIFICANT CHANGE UP (ref 1.7–9.3)
NEUTROPHILS # BLD AUTO: 4.38 K/UL — SIGNIFICANT CHANGE UP (ref 1.4–6.5)
NEUTROPHILS # BLD AUTO: 4.38 K/UL — SIGNIFICANT CHANGE UP (ref 1.4–6.5)
NEUTROPHILS NFR BLD AUTO: 77.5 % — HIGH (ref 42.2–75.2)
NEUTROPHILS NFR BLD AUTO: 77.5 % — HIGH (ref 42.2–75.2)
NRBC # BLD: 0 /100 WBCS — SIGNIFICANT CHANGE UP (ref 0–0)
NRBC # BLD: 0 /100 WBCS — SIGNIFICANT CHANGE UP (ref 0–0)
PLATELET # BLD AUTO: 182 K/UL — SIGNIFICANT CHANGE UP (ref 130–400)
PLATELET # BLD AUTO: 182 K/UL — SIGNIFICANT CHANGE UP (ref 130–400)
PMV BLD: 10 FL — SIGNIFICANT CHANGE UP (ref 7.4–10.4)
PMV BLD: 10 FL — SIGNIFICANT CHANGE UP (ref 7.4–10.4)
POTASSIUM SERPL-MCNC: 3.4 MMOL/L — LOW (ref 3.5–5)
POTASSIUM SERPL-MCNC: 3.4 MMOL/L — LOW (ref 3.5–5)
POTASSIUM SERPL-SCNC: 3.4 MMOL/L — LOW (ref 3.5–5)
POTASSIUM SERPL-SCNC: 3.4 MMOL/L — LOW (ref 3.5–5)
PROT SERPL-MCNC: 5.7 G/DL — LOW (ref 6–8)
PROT SERPL-MCNC: 5.7 G/DL — LOW (ref 6–8)
RBC # BLD: 4.05 M/UL — LOW (ref 4.2–5.4)
RBC # BLD: 4.05 M/UL — LOW (ref 4.2–5.4)
RBC # FLD: 13.4 % — SIGNIFICANT CHANGE UP (ref 11.5–14.5)
RBC # FLD: 13.4 % — SIGNIFICANT CHANGE UP (ref 11.5–14.5)
SODIUM SERPL-SCNC: 141 MMOL/L — SIGNIFICANT CHANGE UP (ref 135–146)
SODIUM SERPL-SCNC: 141 MMOL/L — SIGNIFICANT CHANGE UP (ref 135–146)
WBC # BLD: 5.65 K/UL — SIGNIFICANT CHANGE UP (ref 4.8–10.8)
WBC # BLD: 5.65 K/UL — SIGNIFICANT CHANGE UP (ref 4.8–10.8)
WBC # FLD AUTO: 5.65 K/UL — SIGNIFICANT CHANGE UP (ref 4.8–10.8)
WBC # FLD AUTO: 5.65 K/UL — SIGNIFICANT CHANGE UP (ref 4.8–10.8)

## 2023-10-21 PROCEDURE — 99239 HOSP IP/OBS DSCHRG MGMT >30: CPT

## 2023-10-21 RX ORDER — POTASSIUM CHLORIDE 20 MEQ
20 PACKET (EA) ORAL ONCE
Refills: 0 | Status: COMPLETED | OUTPATIENT
Start: 2023-10-21 | End: 2023-10-21

## 2023-10-21 RX ADMIN — LOSARTAN POTASSIUM 100 MILLIGRAM(S): 100 TABLET, FILM COATED ORAL at 05:20

## 2023-10-21 RX ADMIN — Medication 20 MILLIEQUIVALENT(S): at 14:44

## 2023-10-21 RX ADMIN — SIMVASTATIN 20 MILLIGRAM(S): 20 TABLET, FILM COATED ORAL at 20:29

## 2023-10-21 RX ADMIN — Medication 1: at 11:44

## 2023-10-21 RX ADMIN — AMLODIPINE BESYLATE 5 MILLIGRAM(S): 2.5 TABLET ORAL at 05:20

## 2023-10-21 NOTE — DISCHARGE NOTE PROVIDER - NSDCMRMEDTOKEN_GEN_ALL_CORE_FT
amLODIPine 5 mg oral tablet: 1 tab(s) orally once a day  hydroCHLOROthiazide 25 mg oral tablet: 1 tab(s) orally once a day  losartan 100 mg oral tablet: 1 tab(s) orally once a day  metFORMIN 500 mg oral tablet, extended release: 1 tab(s) orally once a day  simvastatin 20 mg oral tablet: 1 tab(s) orally once a day (at bedtime)  vitamins: once a day

## 2023-10-21 NOTE — PROGRESS NOTE ADULT - ASSESSMENT
78-year-old female with pmhx of HTN, DM, DLD  presents to ED with weakness and fever since last night.    #Malaise and fever  - has been afebrile during hospitalization; feels well. Per pt ?could be feeling weak due to decreased PO intake.   - No evidence of sepsis on admission: no leukocytosis, cxr and ua neg, rvp swab neg  - F/u blood cx  - Monitor off abx  - Wean off O2: sat 97% on 2L O2    # DM insulin per protocol   POCT Blood Glucose.: 134 mg/dL (10-20-23 @ 17:16)    # hyperlipidemia simvastatin 20   # hypertension, controlled, hold meds if sbp less than 110     #Progress Note Handoff    Pending : Blood cx  Dispo: DC home today if blood cx negative. Plan dw pt at bedside.  78-year-old female with pmhx of HTN, DM, DLD  presents to ED with weakness and fever since last night.    #Malaise and fever  - has been afebrile during hospitalization; feels well. Per pt ?could be feeling weak due to decreased PO intake.   - No evidence of sepsis on admission: no leukocytosis, cxr and ua neg, rvp swab neg  - F/u blood cx  - Monitor off abx  - Wean off O2: sat 97% on 2L O2    #Hypokalemia  -K 3.2 on admission: repleted    -f/u repeat    # DM insulin per protocol   POCT Blood Glucose.: 134 mg/dL (10-20-23 @ 17:16)    # hyperlipidemia simvastatin 20   # hypertension, controlled, hold meds if sbp less than 110     #Progress Note Handoff    Pending : Blood cx  Dispo: DC home today if blood cx negative. Plan dw pt at bedside.

## 2023-10-21 NOTE — DISCHARGE NOTE PROVIDER - NSDCFUADDINST_GEN_ALL_CORE_FT
You had low potassium while in the hospital which was corrected. please follow up with your PMD to get BMP checked in 1 week.

## 2023-10-21 NOTE — DISCHARGE NOTE PROVIDER - NSDCCPCAREPLAN_GEN_ALL_CORE_FT
PRINCIPAL DISCHARGE DIAGNOSIS  Diagnosis: Pneumonia  Assessment and Plan of Treatment: You came in for fevers that were possibly due to a developing viral pneumonia. Your vitals were stable in the hospital and the medical team determined that you were healthy enough for discharge.  Please follow up with your primary care physician for evaluation of your symptoms.      SECONDARY DISCHARGE DIAGNOSES  Diagnosis: Fever  Assessment and Plan of Treatment:

## 2023-10-21 NOTE — DISCHARGE NOTE PROVIDER - CARE PROVIDER_API CALL
Larissa Timorah  Internal Medicine  [address], [address]  [place] [address]  Phone: [phone]  Fax: [phone]  Follow Up Time:

## 2023-10-21 NOTE — DISCHARGE NOTE PROVIDER - HOSPITAL COURSE
Case of 78-year-old female with pmhx of HTN, DM, DLD  presents to ED with weakness and fever since last night. She says that yesterday she was feeling weak, and earlier today she had a fever undocumented at home. No cough, sore throat, chest pain, diarrhea, urinary symptoms, recent travel. Of note, her  has been having mild cough and congestion over last week. In the ED, vitals were · /77 mm Hg,  Heart [id number] /min · Temp (F) 102.2 Degrees F SpO2 (%) 99 % on room air. Labs grossly unremarkable except for potassium 3.2. CXR no evidence of infection. RVP negative. Given levaquin and 20meq potassium. Patient is currently medically stable for discharge. Case of 78-year-old female with pmhx of HTN, DM, DLD  presents to ED with weakness and fever since last night. She says that yesterday she was feeling weak, and earlier today she had a fever undocumented at home. No cough, sore throat, chest pain, diarrhea, urinary symptoms, recent travel. Of note, her  has been having mild cough and congestion over last week. In the ED, vitals were · /77 mm Hg,  Heart [id number] /min · Temp (F) 102.2 Degrees F SpO2 (%) 99 % on room air. Labs grossly unremarkable except for potassium 3.2. CXR no evidence of infection. RVP negative. Given levaquin and 20meq potassium. Patient is currently medically stable for discharge.      #Fever  #Tachycardia  #Possible viral infection  - RVP negative  - No leukocytosis  - No clear focus for fever yet, could be early vital URTI infection although no clear respiratory symptoms yet    #HTN  #Hypokalemia  - C/w amlodipine  - C/w losartan    #DM  #DLD  - C/w statin  - c/w home metformin

## 2023-10-22 ENCOUNTER — TRANSCRIPTION ENCOUNTER (OUTPATIENT)
Age: 78
End: 2023-10-22

## 2023-10-22 VITALS
SYSTOLIC BLOOD PRESSURE: 153 MMHG | HEART RATE: 75 BPM | TEMPERATURE: 96 F | DIASTOLIC BLOOD PRESSURE: 70 MMHG | RESPIRATION RATE: 18 BRPM

## 2023-10-22 PROCEDURE — 99239 HOSP IP/OBS DSCHRG MGMT >30: CPT

## 2023-10-22 RX ADMIN — LOSARTAN POTASSIUM 100 MILLIGRAM(S): 100 TABLET, FILM COATED ORAL at 05:58

## 2023-10-22 RX ADMIN — Medication 1: at 08:18

## 2023-10-22 RX ADMIN — AMLODIPINE BESYLATE 5 MILLIGRAM(S): 2.5 TABLET ORAL at 05:58

## 2023-10-22 NOTE — DISCHARGE NOTE NURSING/CASE MANAGEMENT/SOCIAL WORK - NSDCPEFALRISK_GEN_ALL_CORE
For information on Fall & Injury Prevention, visit: [email or web address] OR  [email or web address] OR  https://www.cdc.gov/steadi/patient.html

## 2023-10-22 NOTE — DISCHARGE NOTE NURSING/CASE MANAGEMENT/SOCIAL WORK - PATIENT PORTAL LINK FT
You can access the FollowMyHealth Patient Portal offered by [facility] by registering at the following website: [email or web address]. By joining [facility]’s FollowMyHealth portal, you will also be able to view your health information using other applications (apps) compatible with our system.

## 2023-10-22 NOTE — PROGRESS NOTE ADULT - SUBJECTIVE AND OBJECTIVE BOX
*IM ATTENDING NOTE*      ANEESH LONNY  78y  Female      Patient is a 78y old  Female who presents with a chief complaint of weakness and fever (21 Oct 2023 10:14)      INTERVAL HPI/OVERNIGHT EVENTS:  Pt feels well today; she wants to be discharged. Denied any complaints this AM.       Vital Signs Last 24 Hrs  T(C): 36.9 (21 Oct 2023 08:25), Max: 37.2 (20 Oct 2023 19:00)  T(F): 98.4 (21 Oct 2023 08:25), Max: 98.9 (20 Oct 2023 19:00)  HR: 90 (21 Oct 2023 08:25) (83 - 90)  BP: 150/73 (21 Oct 2023 08:25) (118/55 - 150/73)  RR: 18 (21 Oct 2023 08:25) (18 - 18)  SpO2: 98% (21 Oct 2023 05:39) (95% - 99%)    Parameters below as of 20 Oct 2023 23:27  Patient On (Oxygen Delivery Method): nasal cannula  O2 Flow (L/min): 2    PHYSICAL EXAM:  GEN: No acute distress  LUNGS: Clear to auscultation bilaterally   HEART: S1/S2 present. RRR.   ABD/ GI: Soft, non-tender, non-distended. Bowel sounds present  EXT: NC/NC/NE/2+PP/CAMPOS  NEURO: AAOX3    LABS                          11.5   5.65  )-----------( 182      ( 21 Oct 2023 07:51 )             35.4     10-21    141  |  105  |  7<L>  ----------------------------<  141<H>  3.4<L>   |  23  |  0.6<L>    Ca    8.7      21 Oct 2023 07:51  Mg     1.9     10-21    TPro  5.7<L>  /  Alb  3.6  /  TBili  0.5  /  DBili  x   /  AST  15  /  ALT  12  /  AlkPhos  56  10-21    Lactate Trend  10-20 @ 10:43 Lactate:1.7       CAPILLARY BLOOD GLUCOSE  POCT Blood Glucose.: 196 mg/dL (21 Oct 2023 11:04)            
*IM ATTENDING NOTE*      LONNY MELENDREZ  78y  Female      Patient is a 78y old  Female who presents with a chief complaint of weakness and fever (21 Oct 2023 10:14)      INTERVAL HPI/OVERNIGHT EVENTS:  Pt feels well, denied any complaints.     Vital Signs Last 24 Hrs  T(C): 35.8 (22 Oct 2023 07:58), Max: 36.7 (22 Oct 2023 00:17)  T(F): 96.5 (22 Oct 2023 07:58), Max: 98 (22 Oct 2023 00:17)  HR: 75 (22 Oct 2023 07:58) (74 - 79)  BP: 153/70 (22 Oct 2023 07:58) (133/61 - 153/70)  RR: 18 (22 Oct 2023 07:58) (18 - 18)      PHYSICAL EXAM:  GENERAL: NAD, well-developed  PSYCH: no agitation, baseline mentation  NERVOUS SYSTEM:  Alert & Oriented X3  PULMONARY: Clear to auscultation bilaterally  CARDIOVASCULAR: Regular rate and rhythm  GI: Soft, Nontender, Nondistended; Bowel sounds present  EXTREMITIES:  2+ Peripheral Pulses, No clubbing, cyanosis, or edema  LYMPH: No lymphadenopathy noted  SKIN: No rashes or lesions      LABS                          11.5   5.65  )-----------( 182      ( 21 Oct 2023 07:51 )             35.4     10-21    141  |  105  |  7<L>  ----------------------------<  141<H>  3.4<L>   |  23  |  0.6<L>    Ca    8.7      21 Oct 2023 07:51  Mg     1.9     10-21    TPro  5.7<L>  /  Alb  3.6  /  TBili  0.5  /  DBili  x   /  AST  15  /  ALT  12  /  AlkPhos  56  10-21      POCT Blood Glucose.: 151 mg/dL (22 Oct 2023 08:13)

## 2023-10-22 NOTE — DISCHARGE NOTE NURSING/CASE MANAGEMENT/SOCIAL WORK - NSDCVIVACCINE_GEN_ALL_CORE_FT
Tdap; 14-May-2020 17:00; Emma Delatorre (WILL); [employer]; [id number] (Exp. Date: 15-Aug-2021); IntraMuscular; Deltoid Left.; 0.5 milliLiter(s); VIS (VIS Published: 09-May-2013, VIS Presented: 14-May-2020);

## 2023-10-22 NOTE — PROGRESS NOTE ADULT - ASSESSMENT
78-year-old female with pmhx of HTN, DM, DLD  presents to ED with weakness and fever since last night.    #Malaise and fever  - has been afebrile during hospitalization; feels well. Per pt ?could be feeling weak due to decreased PO intake.   - No evidence of sepsis on admission: no leukocytosis, cxr and ua neg, rvp swab neg  - F/u blood cx  - Monitor off abx  - Wean off O2: sat 97% on 2L O2    #Hypokalemia  -K 3.2 on admission: repleted    -f/u repeat    # DM   -insulin per protocol   -resume home meds at dc    # hyperlipidemia simvastatin 20   # hypertension, controlled, hold meds if sbp less than 110     #Progress Note Handoff    Dispo: DC home today. Plan dw pt at bedside.

## 2023-10-23 ENCOUNTER — TRANSCRIPTION ENCOUNTER (OUTPATIENT)
Age: 78
End: 2023-10-23

## 2023-10-25 ENCOUNTER — TRANSCRIPTION ENCOUNTER (OUTPATIENT)
Age: 78
End: 2023-10-25

## 2023-10-25 LAB
CULTURE RESULTS: SIGNIFICANT CHANGE UP
SPECIMEN SOURCE: SIGNIFICANT CHANGE UP

## 2023-10-26 DIAGNOSIS — J18.9 PNEUMONIA, UNSPECIFIED ORGANISM: ICD-10-CM

## 2023-10-26 DIAGNOSIS — E87.6 HYPOKALEMIA: ICD-10-CM

## 2023-10-26 DIAGNOSIS — Z79.82 LONG TERM (CURRENT) USE OF ASPIRIN: ICD-10-CM

## 2023-10-26 DIAGNOSIS — Z79.84 LONG TERM (CURRENT) USE OF ORAL HYPOGLYCEMIC DRUGS: ICD-10-CM

## 2023-10-26 DIAGNOSIS — E78.5 HYPERLIPIDEMIA, UNSPECIFIED: ICD-10-CM

## 2023-10-26 DIAGNOSIS — E11.9 TYPE 2 DIABETES MELLITUS WITHOUT COMPLICATIONS: ICD-10-CM

## 2023-10-26 DIAGNOSIS — I10 ESSENTIAL (PRIMARY) HYPERTENSION: ICD-10-CM

## 2023-10-26 DIAGNOSIS — Z79.899 OTHER LONG TERM (CURRENT) DRUG THERAPY: ICD-10-CM

## 2023-11-03 ENCOUNTER — TRANSCRIPTION ENCOUNTER (OUTPATIENT)
Age: 78
End: 2023-11-03

## 2023-11-08 ENCOUNTER — TRANSCRIPTION ENCOUNTER (OUTPATIENT)
Age: 78
End: 2023-11-08

## 2023-11-14 NOTE — ED PROVIDER NOTE - NS ED ATTENDING STATEMENT MOD
Male
This was a shared visit with the VIVIENNE. I reviewed and verified the documentation and independently performed the documented:

## 2023-11-22 ENCOUNTER — TRANSCRIPTION ENCOUNTER (OUTPATIENT)
Age: 78
End: 2023-11-22

## 2023-12-08 ENCOUNTER — TRANSCRIPTION ENCOUNTER (OUTPATIENT)
Age: 78
End: 2023-12-08

## 2023-12-08 ENCOUNTER — OUTPATIENT (OUTPATIENT)
Dept: OUTPATIENT SERVICES | Facility: HOSPITAL | Age: 78
LOS: 1 days | End: 2023-12-08
Payer: MEDICARE

## 2023-12-08 DIAGNOSIS — Z98.890 OTHER SPECIFIED POSTPROCEDURAL STATES: Chronic | ICD-10-CM

## 2023-12-08 DIAGNOSIS — R05.9 COUGH, UNSPECIFIED: ICD-10-CM

## 2023-12-08 PROCEDURE — 71046 X-RAY EXAM CHEST 2 VIEWS: CPT | Mod: 26

## 2023-12-08 PROCEDURE — 71046 X-RAY EXAM CHEST 2 VIEWS: CPT

## 2023-12-09 DIAGNOSIS — R05.9 COUGH, UNSPECIFIED: ICD-10-CM

## 2023-12-11 NOTE — ASU PATIENT PROFILE, ADULT - ATTEMPT TO OOB
Medicare Message     Important Message from Medicare regarding Discharge Appeal Rights Given to patient/caregiver; Explained to patient/caregiver; Signed/date by patient/caregiver Given to patient/caregiver; Explained to patient/caregiver; Signed/date by patient/caregiver   Date IMM was signed 12/9/2023 12/11/2023   Time IMM was signed [phone]      no

## 2024-07-18 ENCOUNTER — OUTPATIENT (OUTPATIENT)
Dept: OUTPATIENT SERVICES | Facility: HOSPITAL | Age: 79
LOS: 1 days | End: 2024-07-18
Payer: MEDICARE

## 2024-07-18 ENCOUNTER — RESULT REVIEW (OUTPATIENT)
Age: 79
End: 2024-07-18

## 2024-07-18 DIAGNOSIS — Z12.31 ENCOUNTER FOR SCREENING MAMMOGRAM FOR MALIGNANT NEOPLASM OF BREAST: ICD-10-CM

## 2024-07-18 DIAGNOSIS — Z98.890 OTHER SPECIFIED POSTPROCEDURAL STATES: Chronic | ICD-10-CM

## 2024-07-18 PROCEDURE — 77067 SCR MAMMO BI INCL CAD: CPT | Mod: 26

## 2024-07-18 PROCEDURE — 77067 SCR MAMMO BI INCL CAD: CPT

## 2024-07-18 PROCEDURE — 77063 BREAST TOMOSYNTHESIS BI: CPT | Mod: 26

## 2024-07-18 PROCEDURE — 77063 BREAST TOMOSYNTHESIS BI: CPT

## 2024-07-19 DIAGNOSIS — Z12.31 ENCOUNTER FOR SCREENING MAMMOGRAM FOR MALIGNANT NEOPLASM OF BREAST: ICD-10-CM

## 2024-07-25 ENCOUNTER — APPOINTMENT (OUTPATIENT)
Dept: BREAST CENTER | Facility: CLINIC | Age: 79
End: 2024-07-25
Payer: MEDICARE

## 2024-07-25 DIAGNOSIS — N60.99 UNSPECIFIED BENIGN MAMMARY DYSPLASIA OF UNSPECIFIED BREAST: ICD-10-CM

## 2024-07-25 DIAGNOSIS — N64.89 OTHER SPECIFIED DISORDERS OF BREAST: ICD-10-CM

## 2024-07-25 DIAGNOSIS — D24.9 BENIGN NEOPLASM OF UNSPECIFIED BREAST: ICD-10-CM

## 2024-07-25 PROCEDURE — 99213 OFFICE O/P EST LOW 20 MIN: CPT

## 2024-07-25 NOTE — HISTORY OF PRESENT ILLNESS
[FreeTextEntry1] : PCP: Dr. Ashley Tim  s/p US Guided Core Bx - 04/19/2021: Left, 5:00 N5, 1.6cm: (stoplight) - Complex sclerosing lesion (radial scar) containing microcalcifications.  (-) family hx - breast / ovarian cancer.  Annika Model Risk Assessment: (recalculated w/ surgical path) 5 yr - 4.9%  Life - 10.4%  s/p Left Breast NLOC - 06/21/2021 = Focal atypical lobular hyperplasia. Complex sclerosing lesion (radial scar) located adjacent to a healing prior biopsy site and a separate intraductal papilloma containing focal florid duct hyperplasia.   LONNY MELENDREZ is a 76 year old female patient who presents today in follow up for left breast radial scar, focal ALH and intraductal papilloma. Since her last visit, she has no new breast related complaints.  Imaging with a left breast unilateral diagnostic mammogram was done on 12/09/2021, which revealed there are scattered areas of fibroglandular density. Postsurgical architectural distortion is at the 6:00 axis of the left breast, posterior depth. Benign-appearing calcifications are present in the left breast. BI-RADS Category 2: Benign .  INTERVAL HISTORY 9/8/22 Lonny is here for her six months follow up visit  She has no breast related complaints at this time.  She denies any breast pain, has not palpated any new palpable masses in either breast and denies any nipple discharge or retraction.  Her imaging is as follows; 06/17/2022 b/l mammo  - scattered areas of fibroglandular density. -an area of benign architectural distortion corresponding to the site of surgery seen in the left breast. BIRADS2  INTERVAL HISTORY 6/26/23 Lonny is here for her one year follow up visit  She has no breast related complaints at this time.  She denies any breast pain, has not palpated any new palpable masses in either breast and denies any nipple discharge or retraction.  Her imaging is as follows; 06/23/2023 b/l mammo -scattered areas of fibroglandular density - an area of benign architectural distortion corresponding to the site of surgery seen in the left breast BIRADS2   INTERVAL HISTORY 7/25/24 Lonny is here for her one year follow up visit  She has no breast related complaints at this time.  She denies any breast pain, has not palpated any new palpable masses in either breast and denies any nipple discharge or retraction.  Her imaging is as follows:  07/18/2024 b/l mammo-->birads2 scattered areas of fibroglandular density area of benign architectural distortion corresponding to the site of surgery seen in the left breast (1) Other Medications/None

## 2024-07-25 NOTE — ASSESSMENT
[FreeTextEntry1] : LONNY is a aaron 79 year old patient who presented today in follow up for a history of left breast radial scar, focal ALH and intraductal papilloma.   She has been doing well with no new breast related complaints.   Her imaging is as follows:  07/18/2024 b/l mammo-->birads2 scattered areas of fibroglandular density area of benign architectural distortion corresponding to the site of surgery seen in the left breast  Physical exam was unrevealing today.  PLAN: - bilateral screening mammogram will be due in July 19, 2025, and that will be scheduled today.  - return for follow-up and clinical breast exam after imaging    All of her questions were appropriately answered. She knows to call with any concerns.

## 2024-07-25 NOTE — DATA REVIEWED
[FreeTextEntry1] :  [id number]     EXAM:  MG MAMMO SCREEN W SAVITA BI#   ORDERED BY: LUCIA GILLETTE  PROCEDURE DATE:  07/18/2024    INTERPRETATION:  HISTORY: Bilateral MG MAMMO SCREEN W SAVITA BI# was performed. Patient is 79 years old and is seen for screening. The patient has no personal history of cancer.  The patient has the following family history of breast cancer:  female cousin, breast cancer.  RISK ASSESSMENT: NCI Lifetime Risk: 4.4 Tyrer-Cuzick Lifetime Risk: 3.1  CLINICAL BREAST EXAM: The patient reports their last clinical breast exam was performed over one year ago.  COMPARISON STUDIES: The present examination has been compared to prior imaging studies performed at [facility] on 12/09/2021, 06/17/2022 and 06/23/2023.  MAMMOGRAM FINDINGS: Mammography was performed including the following views: bilateral craniocaudal with tomosynthesis, bilateral mediolateral oblique with tomosynthesis.  The examination includes digital synthetic 2D and digital tomosynthesis 3D images. Additional imaging analysis was performed using CAD (computer-aided detection) software.  There are scattered areas of fibroglandular density.  There is an area of benign architectural distortion corresponding to the site of surgery seen in the left breast.  No suspicious mass, grouping of calcifications, or other abnormality is identified.  IMPRESSION: There is no mammographic evidence of malignancy.  RECOMMENDATION: Unless otherwise indicated by clinical findings, annual screening mammography recommended.  ASSESSMENT: BI-RADS Category 2:  Benign

## 2024-07-25 NOTE — PHYSICAL EXAM
[Normocephalic] : normocephalic [Atraumatic] : atraumatic [EOMI] : extra ocular movement intact [Examined in the supine and seated position] : examined in the supine and seated position [Symmetrical] : symmetrical [No dominant masses] : no dominant masses in right breast  [No dominant masses] : no dominant masses left breast [No Nipple Retraction] : no left nipple retraction [No Nipple Discharge] : no left nipple discharge [No Axillary Lymphadenopathy] : no left axillary lymphadenopathy [No Edema] : no edema [No Rashes] : no rashes [No Ulceration] : no ulceration [de-identified] : On physical exam, there are no discrete masses in either breast or axilla. There is no nipple discharge or inversion bilaterally. There are no skin changes bilaterally.

## 2024-07-25 NOTE — PHYSICAL EXAM
[Normocephalic] : normocephalic [Atraumatic] : atraumatic [EOMI] : extra ocular movement intact [Examined in the supine and seated position] : examined in the supine and seated position [Symmetrical] : symmetrical [No dominant masses] : no dominant masses in right breast  [No dominant masses] : no dominant masses left breast [No Nipple Retraction] : no left nipple retraction [No Nipple Discharge] : no left nipple discharge [No Axillary Lymphadenopathy] : no left axillary lymphadenopathy [No Edema] : no edema [No Rashes] : no rashes [No Ulceration] : no ulceration [de-identified] : On physical exam, there are no discrete masses in either breast or axilla. There is no nipple discharge or inversion bilaterally. There are no skin changes bilaterally.

## 2024-07-25 NOTE — HISTORY OF PRESENT ILLNESS
[FreeTextEntry1] : PCP: Dr. Ashley Tim  s/p US Guided Core Bx - 04/19/2021: Left, 5:00 N5, 1.6cm: (stoplight) - Complex sclerosing lesion (radial scar) containing microcalcifications.  (-) family hx - breast / ovarian cancer.  Annika Model Risk Assessment: (recalculated w/ surgical path) 5 yr - 4.9%  Life - 10.4%  s/p Left Breast NLOC - 06/21/2021 = Focal atypical lobular hyperplasia. Complex sclerosing lesion (radial scar) located adjacent to a healing prior biopsy site and a separate intraductal papilloma containing focal florid duct hyperplasia.   LONNY MELENDREZ is a 76 year old female patient who presents today in follow up for left breast radial scar, focal ALH and intraductal papilloma. Since her last visit, she has no new breast related complaints.  Imaging with a left breast unilateral diagnostic mammogram was done on 12/09/2021, which revealed there are scattered areas of fibroglandular density. Postsurgical architectural distortion is at the 6:00 axis of the left breast, posterior depth. Benign-appearing calcifications are present in the left breast. BI-RADS Category 2: Benign .  INTERVAL HISTORY 9/8/22 Lonny is here for her six months follow up visit  She has no breast related complaints at this time.  She denies any breast pain, has not palpated any new palpable masses in either breast and denies any nipple discharge or retraction.  Her imaging is as follows; 06/17/2022 b/l mammo  - scattered areas of fibroglandular density. -an area of benign architectural distortion corresponding to the site of surgery seen in the left breast. BIRADS2  INTERVAL HISTORY 6/26/23 Lonny is here for her one year follow up visit  She has no breast related complaints at this time.  She denies any breast pain, has not palpated any new palpable masses in either breast and denies any nipple discharge or retraction.  Her imaging is as follows; 06/23/2023 b/l mammo -scattered areas of fibroglandular density - an area of benign architectural distortion corresponding to the site of surgery seen in the left breast BIRADS2   INTERVAL HISTORY 7/25/24 Lonny is here for her one year follow up visit  She has no breast related complaints at this time.  She denies any breast pain, has not palpated any new palpable masses in either breast and denies any nipple discharge or retraction.  Her imaging is as follows:  07/18/2024 b/l mammo-->birads2 scattered areas of fibroglandular density area of benign architectural distortion corresponding to the site of surgery seen in the left breast

## 2025-02-26 ENCOUNTER — APPOINTMENT (OUTPATIENT)
Facility: CLINIC | Age: 80
End: 2025-02-26
Payer: MEDICARE

## 2025-02-26 ENCOUNTER — NON-APPOINTMENT (OUTPATIENT)
Age: 80
End: 2025-02-26

## 2025-02-26 PROCEDURE — 99214 OFFICE O/P EST MOD 30 MIN: CPT | Mod: 25

## 2025-02-26 PROCEDURE — 92134 CPTRZ OPH DX IMG PST SGM RTA: CPT

## 2025-02-26 PROCEDURE — 92202 OPSCPY EXTND ON/MAC DRAW: CPT

## 2025-02-26 PROCEDURE — 65222 REMOVE FOREIGN BODY FROM EYE: CPT | Mod: RT
